# Patient Record
Sex: FEMALE | Race: BLACK OR AFRICAN AMERICAN | NOT HISPANIC OR LATINO | URBAN - METROPOLITAN AREA
[De-identification: names, ages, dates, MRNs, and addresses within clinical notes are randomized per-mention and may not be internally consistent; named-entity substitution may affect disease eponyms.]

---

## 2021-08-18 ENCOUNTER — NURSE TRIAGE (OUTPATIENT)
Dept: NURSING | Facility: CLINIC | Age: 36
End: 2021-08-18

## 2021-08-18 NOTE — TELEPHONE ENCOUNTER
Error, patient wanted to speak with specialty pharamcy.    Denise Antonio RN on 8/18/2021 at 5:23 PM

## 2021-09-28 ENCOUNTER — TRANSFERRED RECORDS (OUTPATIENT)
Dept: HEALTH INFORMATION MANAGEMENT | Facility: CLINIC | Age: 36
End: 2021-09-28

## 2021-09-29 ENCOUNTER — TRANSCRIBE ORDERS (OUTPATIENT)
Dept: OTHER | Age: 36
End: 2021-09-29

## 2021-09-29 DIAGNOSIS — H15.009 SCLERITIS: Primary | ICD-10-CM

## 2021-10-03 PROBLEM — Z86.69 HISTORY OF IRITIS: Status: ACTIVE | Noted: 2021-10-03

## 2021-10-04 ENCOUNTER — OFFICE VISIT (OUTPATIENT)
Dept: OPHTHALMOLOGY | Facility: CLINIC | Age: 36
End: 2021-10-04
Attending: OPHTHALMOLOGY
Payer: COMMERCIAL

## 2021-10-04 ENCOUNTER — LAB (OUTPATIENT)
Dept: LAB | Facility: CLINIC | Age: 36
End: 2021-10-04
Payer: COMMERCIAL

## 2021-10-04 DIAGNOSIS — Z86.69 HISTORY OF IRITIS: ICD-10-CM

## 2021-10-04 DIAGNOSIS — H40.051 OCULAR HYPERTENSION OF RIGHT EYE: ICD-10-CM

## 2021-10-04 DIAGNOSIS — H15.091 NODULAR SCLERITIS OF RIGHT EYE: Primary | ICD-10-CM

## 2021-10-04 DIAGNOSIS — Z79.899 HIGH RISK MEDICATION USE: ICD-10-CM

## 2021-10-04 DIAGNOSIS — H15.091 NODULAR SCLERITIS OF RIGHT EYE: ICD-10-CM

## 2021-10-04 PROBLEM — M19.90 INFLAMMATORY ARTHRITIS: Status: ACTIVE | Noted: 2018-10-10

## 2021-10-04 LAB
T PALLIDUM AB SER QL: NONREACTIVE
URATE SERPL-MCNC: 2.9 MG/DL (ref 2.6–6)

## 2021-10-04 PROCEDURE — G0463 HOSPITAL OUTPT CLINIC VISIT: HCPCS

## 2021-10-04 PROCEDURE — 92134 CPTRZ OPH DX IMG PST SGM RTA: CPT | Performed by: OPHTHALMOLOGY

## 2021-10-04 PROCEDURE — 86255 FLUORESCENT ANTIBODY SCREEN: CPT | Performed by: OPHTHALMOLOGY

## 2021-10-04 PROCEDURE — 36415 COLL VENOUS BLD VENIPUNCTURE: CPT | Performed by: PATHOLOGY

## 2021-10-04 PROCEDURE — 86780 TREPONEMA PALLIDUM: CPT | Performed by: OPHTHALMOLOGY

## 2021-10-04 PROCEDURE — 99204 OFFICE O/P NEW MOD 45 MIN: CPT | Mod: GC | Performed by: OPHTHALMOLOGY

## 2021-10-04 PROCEDURE — 82164 ANGIOTENSIN I ENZYME TEST: CPT | Mod: 90 | Performed by: PATHOLOGY

## 2021-10-04 PROCEDURE — 84550 ASSAY OF BLOOD/URIC ACID: CPT | Performed by: PATHOLOGY

## 2021-10-04 RX ORDER — FLURBIPROFEN 100 MG
100 TABLET ORAL
Status: ON HOLD | COMMUNITY
Start: 2021-09-17 | End: 2024-08-27

## 2021-10-04 RX ORDER — PREDNISOLONE ACETATE 10 MG/ML
SUSPENSION/ DROPS OPHTHALMIC 2 TIMES DAILY
COMMUNITY
Start: 2021-09-01 | End: 2021-10-04

## 2021-10-04 RX ORDER — CIPROFLOXACIN HYDROCHLORIDE 3.5 MG/ML
1 SOLUTION/ DROPS TOPICAL 2 TIMES DAILY
Qty: 5 ML | Refills: 1 | Status: ON HOLD | OUTPATIENT
Start: 2021-10-04 | End: 2024-08-27

## 2021-10-04 RX ORDER — IBUPROFEN 600 MG/1
600 TABLET, FILM COATED ORAL
COMMUNITY
Start: 2021-08-30 | End: 2021-10-04

## 2021-10-04 RX ORDER — METHYLPREDNISOLONE 4 MG
TABLET, DOSE PACK ORAL
Qty: 21 TABLET | Refills: 0 | Status: SHIPPED | OUTPATIENT
Start: 2021-10-04 | End: 2021-10-11

## 2021-10-04 RX ORDER — PREDNISOLONE ACETATE 10 MG/ML
1 SUSPENSION/ DROPS OPHTHALMIC 2 TIMES DAILY
Qty: 5 ML | Refills: 2 | Status: ON HOLD | OUTPATIENT
Start: 2021-10-04 | End: 2024-08-27

## 2021-10-04 RX ORDER — BRIMONIDINE TARTRATE AND TIMOLOL MALEATE 2; 5 MG/ML; MG/ML
1 SOLUTION OPHTHALMIC 2 TIMES DAILY
Qty: 5 ML | Refills: 3 | Status: ON HOLD | OUTPATIENT
Start: 2021-10-04 | End: 2024-08-27

## 2021-10-04 RX ORDER — FOLIC ACID 1 MG/1
1 TABLET ORAL
Status: ON HOLD | COMMUNITY
Start: 2021-09-23 | End: 2024-08-27

## 2021-10-04 ASSESSMENT — VISUAL ACUITY
METHOD: SNELLEN - LINEAR
OD_SC: 20/60
OD_PH_SC: 20/20
OS_SC: 20/30

## 2021-10-04 ASSESSMENT — TONOMETRY
IOP_METHOD: TONOPEN
OD_IOP_MMHG: 17
OS_IOP_MMHG: 18

## 2021-10-04 ASSESSMENT — SLIT LAMP EXAM - LIDS
COMMENTS: MILD MGD
COMMENTS: MILD MGD

## 2021-10-04 ASSESSMENT — CONF VISUAL FIELD
METHOD: COUNTING FINGERS
OS_NORMAL: 1
OD_NORMAL: 1

## 2021-10-04 ASSESSMENT — CUP TO DISC RATIO
OS_RATIO: 0.25
OD_RATIO: 0.25

## 2021-10-04 ASSESSMENT — EXTERNAL EXAM - RIGHT EYE: OD_EXAM: NORMAL

## 2021-10-04 ASSESSMENT — EXTERNAL EXAM - LEFT EYE: OS_EXAM: NORMAL

## 2021-10-04 ASSESSMENT — PATIENT HEALTH QUESTIONNAIRE - PHQ9: SUM OF ALL RESPONSES TO PHQ QUESTIONS 1-9: 15

## 2021-10-04 NOTE — PATIENT INSTRUCTIONS
Please get these lab tests done today: Treponema, ANCA, ACE, Uric Acid    Start Medrol dose pack for one week then stop.     Continue prednisolone 2x/day in the right eye    Continue Combigan 2x/day in the right eye    Add an antibiotic drop called Ciprofloxacin 2x/day in the right eye to prevent infection    Continue Enbrel weekly and Methotrexate 20 mg weekly

## 2021-10-04 NOTE — PROGRESS NOTES
Chief Complaint/Presenting Concern: Uveitis evaluation.    History of Present Illness:   Roseann Garcia is a 35 year old patient who presents for evaluation of scleritis of the right eye.    The symptoms of redness, deep pain with headaches, some blurred vision started suddenly 4.5 weeks ago. Ms. Garcia was off Enbrel for a few weeks (due to difficulty getting the medication) and her eye symptoms started shortly after restarting the medication. When her symptoms started, she went to urgent care and was given topical antibiotic eyedrops. Due to lack of improvement she went to Dr Álvaro Palm O.D. ([facility]) and was diagnosed with iritis and started on topical prednisolone drops to be used 4 times per day. At follow up, patient continued to have redness of the eye and was started another which she feels worsened the eye redness and pain. She subsequently was diagnosed with scleritis and saw Dr Merritt Connors MD and Janae Barnes MD (both at [facility]) and was referred to [facility] for second opinion.     Ms. Garcia recently started oral methotrexate a few weeks ago and was also given an oral Medrol Dosepak which she completed 3 days ago.  She felt the Medrol Dosepak helped with the redness but that the symptoms have recurred since stopping this a few days ago    Additional Ocular History:   1. Has a history of iritis which resolved with prednisone drops 10 years ago.      Relevant Past Medical/Family/Social History:  1. Inflammatory arthritis with negative anti-nuclear antibody and RF (2013), also negative HLA B27.  Arthritis has primarily involved her left knee (requiring steroid injections in past) but also elbow and wrist pain and neck stiffness and more recently, plantar fasciatis. She previously tried methotrexate in 2016 but at that time did not follow up for a year and insisted it did not work. Since April 2018, patient has been on Etanercept (Enbrel) 50mg weekly and is currently still on the  medication after that few week gap.. She has found great relief on Etanercept for her joint pain symptoms she is doing well back on methotrexate currently for the last few weeks.  Previously experienced significant weight gain on oral prednisone    2. No known personal (checked) and no family history of sickle cell    Family history diabetes and HTN but denies any autoimmune disease other than   Grandmother history of arthritis.     Self employed, owns a clothing store. alcohol 3 shots everyday, denies smoking cigarette, but does use marijuana    Relevant Review of Systems: Mild wrist pain but used to have knee, wrist, elbow and neck pain prior to using Etanercept. Denies skin findings, mouth ulcers, GI problems, shortness of breath, chest pain. Mild fatigue but no significant GI Issues on Methotrexate.     Laboratory Testing October 4, 2021  Abnormal: TSH (low at 0.08 [0.35-4.94]  Normal (6/24/2021) CBC, creatinine, ALT, AST.  Negative Quant gold (3/2018)     Current eye related medications: Prednisone drops 2 times per day right eye, Combigan 2 times per day right eye, Enbrel 50 mg weekly, Oral Methotrexate 20 mg weekly for the past 2 weeks, Folic acid daily.     Retina/Uveitis Imaging:  OCT Spectralis Macula October 4, 2021  right eye: Normal contour, no choroidal thickening, no CME  left eye:  Normal contour, no choroidal thickening, no CME    Assessment:    1. Nodular anterior scleritis of right eye  Remains active on current therapy    2. History of iritis  Inactive today    3. Ocular hypertension of right eye  Possibly secondary to topical steroid use    4. High risk medication use  Enbrel 50 mg weekly, Oral Methotrexate 20 mg weekly for the past 2 weeks, Folic acid daily.     Plan/Recommendations:    Discussed findings with patient.  Agree that there is nodular anterior scleritis of the right eye which started suddenly a few weeks ago with reinitiation of Enbrel. One of my colleagues found a journal article  which reports 3 cases of scleritis which has been seen in association with Enbrel and referenced here: Scleritis: A Paradoxical Effect of Etanercept? Etanercept-associated Inflammatory Eye Disease. ASHUTOSH GUTIERREZ, NHI WHITT, DEISY RALPH, LIVE SHARP, JOSE MYRICK, THERESE RHODES, REI MONTES, SARAH BENDER. The Journal of Rheumatology Feb 2012, 39 (2) 233-239.  It is also certainly possible that this is a manifestation of the tendency towards inflammation of the body now presenting as scleritis rather than iritis.    Dilated examination shows a slight nodularity of the sclera along the anterior aspect inferiorly in the right eye but no overlying fluid nor choroidal detachment. OCT scan was without thickening of the choroid nor retinal edema.  Hence this is likely isolated anterior nodular scleritis of the right eye.    The left eye has no evidence of iritis and no scleritis currently    We discussed additional lab testing for other causes of scleritis and Ms. Garcia was able to go to the laboratory today: Treponema, ANCA, ACE, Uric acid    Eye pressure is controlled on Combigan, so we will continue this twice daily    We discussed short-term options for therapy including a longer more protracted course of oral prednisone with taper.  However due to significant weight gain previously, we elected on a another course of a Medrol Dosepak for 1 week.  I discussed with the patient that the scleritis could potentially flare when the Medrol Dosepak is stopped, but perhaps with additional time on methotrexate this can help to control the inflammation in the short-term      Continue prednisolone 2x/day in the right eye    Continue Combigan 2x/day in the right eye    Add an antibiotic drop called Ciprofloxacin 2x/day in the right eye to prevent infection given the focal areas of corneal epithelial irregularity in the right eye without infection    Start Medrol dose pack for one week    Continue Enbrel  weekly and Methotrexate 20 mg weekly. I discussed this with the patient and given that her inflammatory arthritis is well controlled on Enbrel, that it would be reasonable to consider additional time on methotrexate to help assess efficacy before making any other recommendations such as switching biologic therapy from Enbrel    RTC 3 weeks tonopen, dilate OD, no testing.     Joe Sam MD  Vitreoretinal Surgery Fellow  [facility]     .Attending Physician Attestation:  Complete documentation of historical and exam elements from today's encounter can be found in the full encounter summary report (not reduplicated in this progress note). I reviewed the chief complaint(s) and history of present illness, and  confirmed and edited as necessary the review of systems, past medical/surgical history, family history, social history, and examination findings as documented by others and the treating Resident or Fellow Physician.    I examined the patient myself, discussed the findings, reviewed all ancillary testing data and modified these results and reports along with the assessment and plan with the Treating Resident or Fellow Physician. I agree with the note as detailed above.   Fady Fox M.D.  Uveitis and Medical Retina  October 4, 2021

## 2021-10-04 NOTE — LETTER
10/4/2021       RE: Roseann Garcia  [address]  [place] [address]     Dear Colleague,    Thank you for referring your patient, Roseann Garcia, to the [facility] at [facility]. Please see a copy of my visit note below.    Chief Complaint/Presenting Concern: Uveitis evaluation.    History of Present Illness:   Roseann Garcia is a 35 year old patient who presents for evaluation of scleritis of the right eye.    The symptoms of redness, deep pain with headaches, some blurred vision started suddenly 4.5 weeks ago. Ms. Garcia was off Enbrel for a few weeks (due to difficulty getting the medication) and her eye symptoms started shortly after restarting the medication. When her symptoms started, she went to urgent care and was given topical antibiotic eyedrops. Due to lack of improvement she went to Dr Álvrao Palm O.D. ([facility]) and was diagnosed with iritis and started on topical prednisolone drops to be used 4 times per day. At follow up, patient continued to have redness of the eye and was started another which she feels worsened the eye redness and pain. She subsequently was diagnosed with scleritis and saw Dr Merritt Connors MD and Janae Barnes MD (both at [facility]) and was referred to [facility] for second opinion.     Ms. Garcia recently started oral methotrexate a few weeks ago and was also given an oral Medrol Dosepak which she completed 3 days ago.  She felt the Medrol Dosepak helped with the redness but that the symptoms have recurred since stopping this a few days ago    Additional Ocular History:   1. Has a history of iritis which resolved with prednisone drops 10 years ago.      Relevant Past Medical/Family/Social History:  1. Inflammatory arthritis with negative anti-nuclear antibody and RF (2013), also negative HLA B27.  Arthritis has primarily involved her left knee (requiring steroid injections in past) but also  elbow and wrist pain and neck stiffness and more recently, plantar fasciatis. She previously tried methotrexate in 2016 but at that time did not follow up for a year and insisted it did not work. Since April 2018, patient has been on Etanercept (Enbrel) 50mg weekly and is currently still on the medication after that few week gap.. She has found great relief on Etanercept for her joint pain symptoms she is doing well back on methotrexate currently for the last few weeks.  Previously experienced significant weight gain on oral prednisone    2. No known personal (checked) and no family history of sickle cell    Family history diabetes and HTN but denies any autoimmune disease other than   Grandmother history of arthritis.     Self employed, owns a clothing store. alcohol 3 shots everyday, denies smoking cigarette, but does use marijuana    Relevant Review of Systems: Mild wrist pain but used to have knee, wrist, elbow and neck pain prior to using Etanercept. Denies skin findings, mouth ulcers, GI problems, shortness of breath, chest pain. Mild fatigue but no significant GI Issues on Methotrexate.     Laboratory Testing October 4, 2021  Abnormal: TSH (low at 0.08 [0.35-4.94]  Normal (6/24/2021) CBC, creatinine, ALT, AST.  Negative Quant gold (3/2018)     Current eye related medications: Prednisone drops 2 times per day right eye, Combigan 2 times per day right eye, Enbrel 50 mg weekly, Oral Methotrexate 20 mg weekly for the past 2 weeks, Folic acid daily.     Retina/Uveitis Imaging:  OCT Spectralis Macula October 4, 2021  right eye: Normal contour, no choroidal thickening, no CME  left eye:  Normal contour, no choroidal thickening, no CME    Assessment:    1. Nodular anterior scleritis of right eye  Remains active on current therapy    2. History of iritis  Inactive today    3. Ocular hypertension of right eye  Possibly secondary to topical steroid use    4. High risk medication use  Enbrel 50 mg weekly, Oral  Methotrexate 20 mg weekly for the past 2 weeks, Folic acid daily.     Plan/Recommendations:    Discussed findings with patient.  Agree that there is nodular anterior scleritis of the right eye which started suddenly a few weeks ago with reinitiation of Enbrel. One of my colleagues found a journal article which reports 3 cases of scleritis which has been seen in association with Enbrel and referenced here: Scleritis: A Paradoxical Effect of Etanercept? Etanercept-associated Inflammatory Eye Disease. ASHUTOSH GUTIERREZ, NHI WHITT, DEISY RALPH, LIVE SHARP, JOSE MYRICK, THERESE RHODES, REI MONTES, SARAH BENDER. The Journal of Rheumatology Feb 2012, 39 (2) 233-239.  It is also certainly possible that this is a manifestation of the tendency towards inflammation of the body now presenting as scleritis rather than iritis.    Dilated examination shows a slight nodularity of the sclera along the anterior aspect inferiorly in the right eye but no overlying fluid nor choroidal detachment. OCT scan was without thickening of the choroid nor retinal edema.  Hence this is likely isolated anterior nodular scleritis of the right eye.    The left eye has no evidence of iritis and no scleritis currently    We discussed additional lab testing for other causes of scleritis and Ms. Garcia was able to go to the laboratory today: Treponema, ANCA, ACE, Uric acid    Eye pressure is controlled on Combigan, so we will continue this twice daily    We discussed short-term options for therapy including a longer more protracted course of oral prednisone with taper.  However due to significant weight gain previously, we elected on a another course of a Medrol Dosepak for 1 week.  I discussed with the patient that the scleritis could potentially flare when the Medrol Dosepak is stopped, but perhaps with additional time on methotrexate this can help to control the inflammation in the short-term      Continue prednisolone  2x/day in the right eye    Continue Combigan 2x/day in the right eye    Add an antibiotic drop called Ciprofloxacin 2x/day in the right eye to prevent infection given the focal areas of corneal epithelial irregularity in the right eye without infection    Start Medrol dose pack for one week    Continue Enbrel weekly and Methotrexate 20 mg weekly. I discussed this with the patient and given that her inflammatory arthritis is well controlled on Enbrel, that it would be reasonable to consider additional time on methotrexate to help assess efficacy before making any other recommendations such as switching biologic therapy from Enbrel    RTC 3 weeks tonopen, dilate OD, no testing.     Joe Sam MD  Vitreoretinal Surgery Fellow  [facility]     .Attending Physician Attestation:  Complete documentation of historical and exam elements from today's encounter can be found in the full encounter summary report (not reduplicated in this progress note). I reviewed the chief complaint(s) and history of present illness, and  confirmed and edited as necessary the review of systems, past medical/surgical history, family history, social history, and examination findings as documented by others and the treating Resident or Fellow Physician.    I examined the patient myself, discussed the findings, reviewed all ancillary testing data and modified these results and reports along with the assessment and plan with the Treating Resident or Fellow Physician. I agree with the note as detailed above.   Fady Fox M.D.  Uveitis and Medical Retina  October 4, 2021    Again, thank you for allowing me to participate in the care of your patient.    Sincerely,    Fady Fox MD  [facility] Dept of Ophthalmology  Uveitis and Medical Retina

## 2021-10-04 NOTE — NURSING NOTE
Chief Complaints and History of Present Illnesses   Patient presents with     Scleritis Evaluation     Chief Complaint(s) and History of Present Illness(es)     Scleritis Evaluation     Laterality: right eye    Onset: gradual    Onset: months ago    Quality: Blurry va      Severity: moderate    Frequency: intermittently    Associated symptoms: dryness, redness, tearing and photophobia.  Negative for flashes and floaters (+irritation   )    Treatments tried: eye drops    Pain scale: 0/10              Comments     States she had uvietis about 2 yrs ago but this is the first flare of scleritis that has lasted for about 4 weeks in the right eye  PF BID right eye   Blue top at bedtime right eye  Loyda Mccollum COT 8:28 AM October 4, 2021

## 2021-10-05 LAB
ACE SERPL-CCNC: 25 U/L
ANCA AB PATTERN SER IF-IMP: NORMAL
C-ANCA TITR SER IF: NORMAL {TITER}

## 2021-10-08 ENCOUNTER — TELEPHONE (OUTPATIENT)
Dept: OPHTHALMOLOGY | Facility: CLINIC | Age: 36
End: 2021-10-08

## 2021-10-08 NOTE — TELEPHONE ENCOUNTER
Spoke to pt at 1500     Pt states needing refill for combigan eye drop-- reviewed Rx sent 10-4-2021 and asked pt to reach out to pharmacy to ensure able to dispense and would call pt back    Spoke to pt at 1600-- pt states pharmacy was able to order in today and pt able to  today.    Pt states eye has felt more irritated without-- no new eye pain/visin change    Recommended trying OTC lubricating eye drops between medicated eye drops.    Pt aware to contact clinic for any worsening symptoms/vision changes    Valeriy Leonardo RN 4:04 PM 10/08/21             [facility] Call Center    Phone Message    May a detailed message be left on voicemail: yes     Reason for Call: Other: Roseann calling to request a call back. She would like to speak to her care team in regards to her eye drops. Please call her back to discuss.      Action Taken: Message routed to:  [facility]  eye    Travel Screening: Not Applicable

## 2021-10-20 ENCOUNTER — TELEPHONE (OUTPATIENT)
Dept: OPHTHALMOLOGY | Facility: CLINIC | Age: 36
End: 2021-10-20

## 2021-10-20 DIAGNOSIS — H15.091 NODULAR SCLERITIS OF RIGHT EYE: ICD-10-CM

## 2021-10-20 RX ORDER — METHYLPREDNISOLONE 4 MG
TABLET, DOSE PACK ORAL
Qty: 21 TABLET | Refills: 0 | Status: SHIPPED | OUTPATIENT
Start: 2021-10-20 | End: 2021-10-20

## 2021-10-20 RX ORDER — METHYLPREDNISOLONE 4 MG
TABLET, DOSE PACK ORAL
Qty: 21 TABLET | Refills: 0 | Status: SHIPPED | OUTPATIENT
Start: 2021-10-20 | End: 2021-12-03

## 2021-10-20 NOTE — TELEPHONE ENCOUNTER
Patient called to request a refill of the Medrol Dosepak.  I discussed with her at the last visit that it is likely the scleritis would flare after she stopped the Medrol Dosepak.  As she does not have an appointment for a week I will refill this 1 time but I will ask our staff to inform her that this is not a long-term solution for her condition and she should make sure she communicates with her rheumatologist regarding the things we discussed last visit    Fady Fox M.D.  Uveitis and Medical Retina  [facility] Department of Ophthalmology and Visual Neurosciences

## 2021-10-20 NOTE — TELEPHONE ENCOUNTER
[facility] Call Center    Phone Message    May a detailed message be left on voicemail: yes     Reason for Call: Medication Question or concern regarding medication   Prescription Clarification  Name of Medication: methylprednisolone (MEDROL DOSEPAK) 4 MG tablet therapy pack  Prescribing Provider: CONY   Pharmacy: [id number] - [place], [place] - [address] AT [facility] [address]   What on the order needs clarification? Pt requesting for a refill of this rx.       Action Taken: Other: eye    Travel Screening: Not Applicable

## 2021-10-20 NOTE — TELEPHONE ENCOUNTER
I spoke to the patient who requested a refill for the medication listed.  I consulted with her provider who would like her to reach out to her rheumatologist for further care plan.  The patient expressed understanding.

## 2021-12-01 ENCOUNTER — TELEPHONE (OUTPATIENT)
Dept: OPHTHALMOLOGY | Facility: CLINIC | Age: 36
End: 2021-12-01
Payer: COMMERCIAL

## 2021-12-01 NOTE — TELEPHONE ENCOUNTER
Thank you for the note and let me know when we receive the photo. She called about a possible flare since her last visit and I did okay another Medrol dose pack but would NOT do so again unless we see her. We can see Friday or next week if needed based on photo. Thank you.

## 2021-12-01 NOTE — TELEPHONE ENCOUNTER
Spoke to pt at 1400    New pain in corner of right eye this AM along with new redness on eye    No photophobia.    No vision changes    Eye irritable and looks like blood spot on white part of eye in corner.    Pt states same symptom as before before flare    Asked pt to send image to [email or web address]   (pt not on MyChart)    Will review message/image with Dr. Fox once received.    Valeriy Leonardo RN 2:17 PM 12/01/21            [facility] Call Center    Phone Message    May a detailed message be left on voicemail: yes     Reason for Call: Symptoms or Concerns     If patient has red-flag symptoms, warm transfer to triage line    Current symptom or concern: Pt states she's having a flare-up of pain in her eye as of this morning.    Symptoms have been present for:  1/2 day(s)    Has patient previously been seen for this? Yes    By : Dr. Fox    Date: 10/4/21    Are there any new or worsening symptoms? No      Action Taken: Message routed to:  [facility] EYE    Travel Screening: Not Applicable

## 2021-12-01 NOTE — TELEPHONE ENCOUNTER
Thank you for forwarding the images by email. It does appear like recurrence of the scleritis. Glad we are able to see her on Friday, 12/3.

## 2021-12-01 NOTE — TELEPHONE ENCOUNTER
Reviewed with Dr. haney      Recommends in clinic evaluation.  Pt scheduled this Friday at 11 AM--9th floor PWB location    Pt aware of date/time/location     Valeriy Leonardo RN 5:31 PM 12/01/21

## 2021-12-03 ENCOUNTER — OFFICE VISIT (OUTPATIENT)
Dept: OPHTHALMOLOGY | Facility: CLINIC | Age: 36
End: 2021-12-03
Attending: OPHTHALMOLOGY
Payer: COMMERCIAL

## 2021-12-03 DIAGNOSIS — H15.091 NODULAR SCLERITIS OF RIGHT EYE: Primary | ICD-10-CM

## 2021-12-03 DIAGNOSIS — Z86.69 HISTORY OF IRITIS: ICD-10-CM

## 2021-12-03 DIAGNOSIS — H40.051 OCULAR HYPERTENSION OF RIGHT EYE: ICD-10-CM

## 2021-12-03 DIAGNOSIS — Z79.899 HIGH RISK MEDICATION USE: ICD-10-CM

## 2021-12-03 PROCEDURE — G0463 HOSPITAL OUTPT CLINIC VISIT: HCPCS

## 2021-12-03 PROCEDURE — 99214 OFFICE O/P EST MOD 30 MIN: CPT | Performed by: OPHTHALMOLOGY

## 2021-12-03 RX ORDER — METHYLPREDNISOLONE 4 MG
TABLET, DOSE PACK ORAL
Qty: 21 TABLET | Refills: 1 | Status: SHIPPED | OUTPATIENT
Start: 2021-12-03 | End: 2022-06-23

## 2021-12-03 ASSESSMENT — VISUAL ACUITY
OS_SC: 20/50
OD_SC+: -2
OS_PH_SC: 20/25
OD_SC: 20/20
METHOD: SNELLEN - LINEAR

## 2021-12-03 ASSESSMENT — CONF VISUAL FIELD
OS_NORMAL: 1
METHOD: COUNTING FINGERS
OD_NORMAL: 1

## 2021-12-03 ASSESSMENT — CUP TO DISC RATIO
OD_RATIO: 0.25
OS_RATIO: 0.25

## 2021-12-03 ASSESSMENT — EXTERNAL EXAM - RIGHT EYE: OD_EXAM: NORMAL

## 2021-12-03 ASSESSMENT — SLIT LAMP EXAM - LIDS
COMMENTS: MILD MGD
COMMENTS: MILD MGD

## 2021-12-03 ASSESSMENT — EXTERNAL EXAM - LEFT EYE: OS_EXAM: NORMAL

## 2021-12-03 ASSESSMENT — TONOMETRY
OD_IOP_MMHG: 9
IOP_METHOD: APPLANATION
OS_IOP_MMHG: 12

## 2021-12-03 NOTE — PATIENT INSTRUCTIONS
Continue Enbrel every week    Continue Oral Methotrexate 20 mg (8 pills) every week and folic acid daily. We will ask Dr. Bedolla about switching to injectable methotrexate up to 25 mg each week. Given that joints doing so well on Enbrel, may be best to continue this biologic    Continue the Medrol dose pack and finish that up. We sent a refill if needed. If this starts flaring every month, we might consider other options with medications    For the Combigan and Ciprofloxacin, okay to continue 2x/day in the right eye for 2 more days then stop    For the steroid drop (Prednisolone), continue 2x/day until next month

## 2021-12-03 NOTE — PROGRESS NOTES
Chief Complaint/Presenting Concern:  Uveitis follow up    Interval History of Present Ocular Illness:  Roseann Garcia is a 35 year old patient who returns for follow up of her scleritis of the right eye.  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  We recommended a Medrol Dosepak, additional lab testing, consideration of potential change in systemic therapy with rheumatology.    Ms. Garcia reported things were doing fairly well although she had some symptoms again in November and then again most recently.  Each time things have responded to the Medrol Dosepak.  Recently she had some important life events and was off methotrexate for 3 weeks.  She asked if this could have been related to the scleritis in the right eye, which is now better on Medrol Dosepak for the last few days along with eyedrops as noted below    Took medrol dose pack and this helped. One more episode few days ago,    Interval Updates to Medical/Family/Social History:    Joints doing well on the current medicines.Had 3 week gap off methotrexate due to other life events including earning her Bachelor's degree in criminal justice!    Relevant Review of Systems Updates:  No joint pains, no coughs/colds.    Laboratory Testin. Normal AST and ALT in 2021  2. Normal ACE. Treponema, ANCA negative. Uric acid normal in 2021    Current eye related medications: Restarted Cipro 2x/day right eye, Combigan 2x/day right eye and Prednisolone 2x/day right eye, Medrol pack (day 3 of 7), Methotrexate 20 mg weekly, Enbrel weekly    Retina/Uveitis Imaging: None toaday    Assessment:     1. Nodular scleritis of right eye  Improved compared to our last visit in October although with some symptoms 2 days ago responding to Medrol Dosepak.  The limbal infiltrates have resolved but there has been interval subtle scleral thinning without uveal exposure    2. History of iritis  No iritis in  either eye today    3. Ocular hypertension of right eye  Normal on Combigan    4. High risk medication use  Oral methotrexate 20 mg weekly, Enbrel weekly, Medrol Dosepak day 3 of 7    Plan/Recommendations:      Discussed findings with patient.  It is possible that this episode of scleritis may have been related to being off the methotrexate for 3 weeks.  Fortunately as previously and currently this is responding to the Medrol Dosepak.  As below, we will ask Dr. Bedolla about potentially switching to subcutaneous methotrexate    We discussed ongoing use of the Enbrel.  Given that the scleritis does not appear to be related to the Enbrel, we discussed possibly switching Biologics to determine if there should be more likely to help resolve the scleritis. Ms. Garcia was very clear that the Enbrel has done dramatically well for her joints and she would be hesitant to switch Biologics with potential uncertainty of benefit for her joints.  Reasonable to start with dose change to methotrexate as below if okay with Dr. Bedolla    Eye pressure is normal in both eyes    No additional lab testing recommended at this time as lab testing we recommended in October 2021 was unrevealing for other causes of scleritis    Continue Enbrel every week    Continue Oral Methotrexate 20 mg (8 pills) every week and folic acid daily. We will ask Dr. Bedolla about switching to injectable methotrexate up to 25 mg each week. Given that joints doing so well on Enbrel, may be best to continue this biologic    Continue the Medrol dose pack and finish that up. We sent a refill if needed. If this starts flaring every month, we might consider other options with medications    For the Combigan and Ciprofloxacin, okay to continue 2x/day in the right eye for 2 more days then stop    For the steroid drop (Prednisolone), continue 2x/day until next month as maintenance    RTC Cancel 12/15 and reschedule to mid January Applanate, dilate right eye, no  testing    Physician Attestation     Attending Physician Attestation:  Complete documentation of historical and exam elements from today's encounter can be found in the full encounter summary report (not reduplicated in this progress note). I personally obtained the chief complaint(s) and history of present illness. I confirmed and edited as necessary the review of systems, past medical/surgical history, family history, social history, and examination findings as documented by others; and I examined the patient myself. I personally reviewed the relevant tests, images, and reports as documented above. I formulated and edited as necessary the assessment and plan and discussed the findings and management plan with the patient and family members present at the time of this visit.  Fady Fox M.D., Uveitis and Medical Retina, December 3, 2021

## 2021-12-03 NOTE — NURSING NOTE
Chief Complaints and History of Present Illnesses   Patient presents with     Scleritis Follow Up     Chief Complaint(s) and History of Present Illness(es)     Scleritis Follow Up     Laterality: right eye    Onset: sudden    Onset: months ago    Course: gradually improving    Associated symptoms: Negative for eye pain, dryness, tearing, flashes, floaters and photophobia    Treatments tried: eye drops    Pain scale: 0/10              Comments     Pt here for Nodular scleritis of right eye.  Vision has improved since last visit.    Prednisolone daily in the right eye  Combigan daily in the right eye  Ciprofloxacin daily in the right eye  Enbrel weekly  Methotrexate 20 mg weekly    ALBIN Cruz December 3, 2021 11:18 AM

## 2021-12-03 NOTE — LETTER
12/3/2021       RE: Roseann Garcia  [address]  [place] [address]     Dear Colleague,    Thank you for referring your patient, Roseann Garcia, to the [facility] at [facility]. Please see a copy of my visit note below.    Chief Complaint/Presenting Concern:  Uveitis follow up    Interval History of Present Ocular Illness: Roseann Garcia is a 35 year old patient who returns for follow up of her scleritis of the right eye.  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  We recommended a Medrol Dosepak, additional lab testing, consideration of potential change in systemic therapy with rheumatology.    Ms. Garcia reported things were doing fairly well although she had some symptoms again in November and then again most recently.  Each time things have responded to the Medrol Dosepak.  Recently she had some important life events and was off methotrexate for 3 weeks.  She asked if this could have been related to the scleritis in the right eye, which is now better on Medrol Dosepak for the last few days along with eyedrops as noted below    Took medrol dose pack and this helped. One more episode few days ago,    Interval Updates to Medical/Family/Social History:    Joints doing well on the current medicines.Had 3 week gap off methotrexate due to other life events including earning her Bachelor's degree in criminal justice!    Relevant Review of Systems Updates:  No joint pains, no coughs/colds.    Laboratory Testin. Normal AST and ALT in 2021  2. Normal ACE. Treponema, ANCA negative. Uric acid normal in 2021    Current eye related medications: Restarted Cipro 2x/day right eye, Combigan 2x/day right eye and Prednisolone 2x/day right eye, Medrol pack (day 3 of 7), Methotrexate 20 mg weekly, Enbrel weekly    Retina/Uveitis Imaging: None toaday    Assessment:   1.  Nodular scleritis of right eye Improved compared to our last visit in October although with some symptoms 2 days ago responding to Medrol Dosepak.  The limbal infiltrates have resolved but there has been interval subtle scleral thinning without uveal exposure    2. History of iritis No iritis in either eye today    3. Ocular hypertension of right eye Normal on Combigan    4. High risk medication use Oral methotrexate 20 mg weekly, Enbrel weekly, Medrol Dosepak day 3 of 7    Plan/Recommendations:      Discussed findings with patient.  It is possible that this episode of scleritis may have been related to being off the methotrexate for 3 weeks.  Fortunately as previously and currently this is responding to the Medrol Dosepak.  As below, we will ask Dr. Bedolla about potentially switching to subcutaneous methotrexate    We discussed ongoing use of the Enbrel.  Given that the scleritis does not appear to be related to the Enbrel, we discussed possibly switching Biologics to determine if there should be more likely to help resolve the scleritis. Ms. Garcia was very clear that the Enbrel has done dramatically well for her joints and she would be hesitant to switch Biologics with potential uncertainty of benefit for her joints.  Reasonable to start with dose change to methotrexate as below if okay with Dr. Bedolla    Eye pressure is normal in both eyes    No additional lab testing recommended at this time as lab testing we recommended in October 2021 was unrevealing for other causes of scleritis    Continue Enbrel every week    Continue Oral Methotrexate 20 mg (8 pills) every week and folic acid daily. We will ask Dr. Bedolla about switching to injectable methotrexate up to 25 mg each week. Given that joints doing so well on Enbrel, may be best to continue this biologic    Continue the Medrol dose pack and finish that up. We sent a refill if needed. If this starts flaring every month, we might consider other options with  medications    For the Combigan and Ciprofloxacin, okay to continue 2x/day in the right eye for 2 more days then stop    For the steroid drop (Prednisolone), continue 2x/day until next month as maintenance    RTC Cancel 12/15 and reschedule to mid January Applanate, dilate right eye, no testing    Again, thank you for allowing me to participate in the care of your patient.      Sincerely,      Fady Fox MD  [facility] Dept of Ophthalmology  Uveitis and Medical Retina

## 2022-02-17 PROBLEM — Z79.899 HIGH RISK MEDICATION USE: Status: ACTIVE | Noted: 2021-10-03

## 2022-02-17 PROBLEM — H15.091: Status: ACTIVE | Noted: 2021-10-04

## 2022-06-27 ENCOUNTER — OFFICE VISIT (OUTPATIENT)
Dept: OPHTHALMOLOGY | Facility: CLINIC | Age: 37
End: 2022-06-27
Attending: OPHTHALMOLOGY
Payer: COMMERCIAL

## 2022-06-27 DIAGNOSIS — Z79.899 HIGH RISK MEDICATION USE: ICD-10-CM

## 2022-06-27 DIAGNOSIS — Z86.69 HISTORY OF IRITIS: ICD-10-CM

## 2022-06-27 DIAGNOSIS — H40.051 OCULAR HYPERTENSION OF RIGHT EYE: ICD-10-CM

## 2022-06-27 DIAGNOSIS — H15.091 NODULAR SCLERITIS OF RIGHT EYE: Primary | ICD-10-CM

## 2022-06-27 PROCEDURE — 99214 OFFICE O/P EST MOD 30 MIN: CPT | Performed by: OPHTHALMOLOGY

## 2022-06-27 PROCEDURE — G0463 HOSPITAL OUTPT CLINIC VISIT: HCPCS

## 2022-06-27 RX ORDER — METHYLPREDNISOLONE 4 MG
TABLET, DOSE PACK ORAL
Qty: 21 TABLET | Refills: 2 | Status: SHIPPED | OUTPATIENT
Start: 2022-06-27 | End: 2022-09-26

## 2022-06-27 ASSESSMENT — VISUAL ACUITY
OD_SC+: -2
OD_SC: 20/25
OS_PH_SC+: -2 SLOW
OS_SC: 20/40
OS_PH_SC: 20/25
METHOD: SNELLEN - LINEAR
OS_SC+: +1

## 2022-06-27 ASSESSMENT — CUP TO DISC RATIO
OS_RATIO: 0.25
OD_RATIO: 0.25

## 2022-06-27 ASSESSMENT — EXTERNAL EXAM - RIGHT EYE: OD_EXAM: NORMAL

## 2022-06-27 ASSESSMENT — TONOMETRY
OS_IOP_MMHG: 15
IOP_METHOD: APPLANATION
OD_IOP_MMHG: 13

## 2022-06-27 ASSESSMENT — SLIT LAMP EXAM - LIDS
COMMENTS: MILD MGD
COMMENTS: MILD MGD

## 2022-06-27 ASSESSMENT — EXTERNAL EXAM - LEFT EYE: OS_EXAM: NORMAL

## 2022-06-27 ASSESSMENT — CONF VISUAL FIELD
OD_NORMAL: 1
OS_NORMAL: 1

## 2022-06-27 NOTE — NURSING NOTE
"Chief Complaints and History of Present Illnesses   Patient presents with     Uveitis Follow-Up     Pt here for follow up on Nodular scleritis of right eye.      Chief Complaint(s) and History of Present Illness(es)     Uveitis Follow-Up     Laterality: right eye    Associated symptoms: Negative for eye pain, headache, photophobia and floaters    Comments: Pt here for follow up on Nodular scleritis of right eye.               Comments     Pt notes flare up \"the other day\". Eyes were doing well up until that point.   Pt using:   Prednisolone 2x/day right eye, Methotrexate 20 mg weekly, Enbrel weekly, just started Medrol dose pack.   SUNIL RHODES 7:46 AM June 27, 2022                   "

## 2022-06-27 NOTE — LETTER
6/27/2022       RE: Roseann Garcia  [address]  [place] [address]     Dear Colleague,    Thank you for referring your patient, Roseann Garcia, to the [facility] [place] at [facility]. Please see a copy of my visit note below.    Chief Complaint/Presenting Concern:  Uveitis follow up.    Interval History of Present Ocular Illness:  Roseann Garcia is a 36 year old patient who returns for follow up of her nodular scleritis of the right eye. At our last visit visit in December 2021, we recommended a Medrol dose pack for the flare of scleritis right eye. Roseann reports things had been doing well until a week ago or so, when the right eye started flaring up. As below, she had planned to be off methotrexate and Enbrel for tummy tuck. We sent a Medrol dose pack.    Interval Updates to Medical/Family/Social History:    Had Tummy Tuck a few weeks ago, and held Enbrel few weeks ago.    Relevant Review of Systems Updates:  Skin healing well, no joint issues.     No recent labs.     Current eye related medications: No steroid drops, No combigan and no ciprofloxacin, Medrol dose pack (day 3 of 6 completed), off Enbrel and Methotrexate for the last 3 weeks (until recovering from surgery).    Retina/Uveitis Imaging: None today    Assessment:     1. Nodular scleritis of right eye  Recurred last week. Flat today. Some limbal inflammation, no thinning.    2. History of iritis  None in either eye today.    3. Ocular hypertension of right eye  Normal off Combigan.    4. High risk medication use  Off Enbrel and methotrexate last three weeks (as recovering from Tummy tuck).    Plan/Recommendations:      Discussed findings with patient. The nodule recurred last week, improved on medrol dose pack. There is no thinning and no active nodule today, some limbal inflammation but no epi defect. This is likely due to being off methotrexate and Enbrel for a few weeks  as planned for tummy tuck and should improve once these are restarted.    There are no internal nodules in the right eye on dilated exam.     The left eye is doing well without inflammation.     Eye pressure is normal off Combigan. We can monitor off this drop as optic nerve without cupping.    Recommend additional testing: None at this time.    Given that Enbrel and Methotrexate had been used without scleritis flares until last week, would not recommend any specific changes recommended. Recommend restarting Methotrexate and Enbrel once your surgeon feels safe.    No steroid drop, antibiotic drop, or combigan needed.    Finish up the Medrol pack as directed. We sent refills to be used if needed. Things should improve once the enbrel and methotrexate are restarted. If not better by 2nd week of July, please let us know.    Recommend follow up with Dr. Graeme coy.     RTC 6 months tonopen, dilate right eye, and RNFL (ordered)    Physician Attestation     Attending Physician Attestation:  Complete documentation of historical and exam elements from today's encounter can be found in the full encounter summary report (not reduplicated in this progress note). I personally obtained the chief complaint(s) and history of present illness. I confirmed and edited as necessary the review of systems, past medical/surgical history, family history, social history, and examination findings as documented by others; and I examined the patient myself. I personally reviewed the relevant tests, images, and reports as documented above. I formulated and edited as necessary the assessment and plan and discussed the findings and management plan with the patient and family members present at the time of this visit.  Fady Fox M.D., Uveitis and Medical Retina, June 27, 2022     Again, thank you for allowing me to participate in the care of your patient.    Sincerely,    Fady Fox MD  [facility] Dept of  Ophthalmology  Uveitis and Medical Retina

## 2022-06-27 NOTE — PROGRESS NOTES
Chief Complaint/Presenting Concern:  Uveitis follow up    Interval History of Present Ocular Illness:  Roseann Garcia is a 36 year old patient who returns for follow up of her nodular scleritis of the right eye. At our last visit visit in December 2021, we recommended a Medrol dose pack for the flare of scleritis right eye. Roseann reports things had been doing well until a week ago or so, when the right eye started flaring up. As below, she had planned to be off methotrexate and Enbrel for tummy tuck. We sent a Medrol dose pack    Interval Updates to Medical/Family/Social History:    Had Tummy Tuck a few weeks ago, and held Enbrel few weeks ago    Relevant Review of Systems Updates:  Skin healing well, no joint issues.     No recent labs     Current eye related medications: No steroid drops, No combigan and no ciprofloxacin, Medrol dose pack (day 3 of 6 completed), off Enbrel and Methotrexate for the last 3 weeks (until recovering from surgery),    Retina/Uveitis Imaging: None today    Assessment:     1. Nodular scleritis of right eye  Recurred last week. Flat today. Some limbal inflammation, no thinning    2. History of iritis  None in either eye today    3. Ocular hypertension of right eye  Normal off Combigan    4. High risk medication use  Off Enbrel and methotrexate last three weeks (as recovering from Tummy tuck)    Plan/Recommendations:      Discussed findings with patient. The nodule recurred last week, improved on medrol dose pack. There is no thinning and no active nodule today, some limbal inflammation but no epi defect. This is likely due to being off methotrexate and Enbrel for a few weeks as planned for tummy tuck and should improve once these are restarted    There are no internal nodules in the right eye on dilated exam.     The left eye is doing well without inflammation.     Eye pressure is normal off Combigan. We can monitor off this drop as optic nerve without cupping    Recommend additional  testing: None at this time    Given that Enbrel and Methotrexate had been used without scleritis flares until last week, would not recommend any specific changes recommended. Recommend restarting Methotrexate and Enbrel once your surgeon feels safe.    No steroid drop, antibiotic drop, or combigan needed    Finish up the Medrol pack as directed. We sent refills to be used if needed. Things should improve once the enbrel and methotrexate are restarted. If not better by 2nd week of July, please let us know    Recommend follow up with Dr. Graeme coy.     RTC 6 months tonopen, dilate right eye, and RNFL (ordered)    Physician Attestation     Attending Physician Attestation:  Complete documentation of historical and exam elements from today's encounter can be found in the full encounter summary report (not reduplicated in this progress note). I personally obtained the chief complaint(s) and history of present illness. I confirmed and edited as necessary the review of systems, past medical/surgical history, family history, social history, and examination findings as documented by others; and I examined the patient myself. I personally reviewed the relevant tests, images, and reports as documented above. I formulated and edited as necessary the assessment and plan and discussed the findings and management plan with the patient and family members present at the time of this visit.  Fady Fox M.D., Uveitis and Medical Retina, June 27, 2022

## 2022-06-27 NOTE — PATIENT INSTRUCTIONS
Given that Enbrel and Methotrexate had been used without scleritis flares until last week, would not recommend any specific changes recommended. Recommend restarting Methotrexate and Enbrel once your surgeon feels safe.  No steroid drop, antibiotic drop, or combigan needed  Finish up the Medrol pack as directed. We sent refills to be used if needed. Things should improve once the enbrel and methotrexate are restarted. If not better by 2nd week of July, please let us know

## 2022-09-26 ENCOUNTER — TELEPHONE (OUTPATIENT)
Dept: OPHTHALMOLOGY | Facility: CLINIC | Age: 37
End: 2022-09-26

## 2022-09-26 DIAGNOSIS — H15.091 NODULAR SCLERITIS OF RIGHT EYE: ICD-10-CM

## 2022-09-26 RX ORDER — METHYLPREDNISOLONE 4 MG
TABLET, DOSE PACK ORAL
Qty: 21 TABLET | Refills: 0 | Status: ON HOLD | OUTPATIENT
Start: 2022-09-26 | End: 2024-08-27

## 2022-09-26 NOTE — TELEPHONE ENCOUNTER
[facility] Call Center    Phone Message    May a detailed message be left on voicemail: yes     Reason for Call: Medication Refill Request    Has the patient contacted the pharmacy for the refill? Yes   Name of medication being requested: methylPREDNISolone (MEDROL DOSEPAK) 4 MG tablet therapy pack    Provider who prescribed the medication: Dr. Fox.  Pharmacy: [id number] - [place], [place] - [address]  Date medication is needed: ASAP.     Patient never picked up the script that was sent back in June and the pharmacy ended up canceling. Please resend.     Action Taken: Other: eye    Travel Screening: Not Applicable

## 2022-09-26 NOTE — TELEPHONE ENCOUNTER
Called and spoke to Roseann     Per Dr. Ruddy Figueroa: I am sending 1 a single Medrol Dosepak without refills.  Please instruct the patient to call us if things are not better after using it for the 6 days    Pt is ok with this plan and will call if symptoms are worse

## 2022-09-26 NOTE — TELEPHONE ENCOUNTER
Received request to send refill on Medrol Dosepak.  There was a note that the medication was not needed in June 2022 and this was canceled. Sent prescription for Medrol Dosepak with instructions to call us if things are not better after its use

## 2023-12-01 ENCOUNTER — TRANSCRIBE ORDERS (OUTPATIENT)
Dept: OTHER | Age: 38
End: 2023-12-01

## 2023-12-01 DIAGNOSIS — N32.81 OVERACTIVE BLADDER: Primary | ICD-10-CM

## 2023-12-07 ENCOUNTER — THERAPY VISIT (OUTPATIENT)
Dept: PHYSICAL THERAPY | Facility: CLINIC | Age: 38
End: 2023-12-07
Attending: STUDENT IN AN ORGANIZED HEALTH CARE EDUCATION/TRAINING PROGRAM
Payer: COMMERCIAL

## 2023-12-07 DIAGNOSIS — N32.81 OVERACTIVE BLADDER: ICD-10-CM

## 2023-12-07 PROCEDURE — 97530 THERAPEUTIC ACTIVITIES: CPT | Mod: GP

## 2023-12-07 PROCEDURE — 97161 PT EVAL LOW COMPLEX 20 MIN: CPT | Mod: GP

## 2023-12-07 PROCEDURE — 97535 SELF CARE MNGMENT TRAINING: CPT | Mod: 59

## 2023-12-07 PROCEDURE — 97110 THERAPEUTIC EXERCISES: CPT | Mod: 59

## 2023-12-07 NOTE — PROGRESS NOTES
PHYSICAL THERAPY EVALUATION  Type of Visit: Evaluation    See electronic medical record for Abuse and Falls Screening details.    Subjective       Presenting condition or subjective complaint:   urinary frequency and leakage and occasional leakage and been deal with most of her life and got worse the last few years. Always have to locate the bathroom anytime she gets somewhere. Pt reports leaking with exercising - lifting, [employer], bodyweight activities which started within the last year.  Date of onset: 12/07/20    Relevant medical history:     Dates & types of surgery:      Prior diagnostic imaging/testing results:       Prior therapy history for the same diagnosis, illness or injury:            Employment:     personal care assistant  Hobbies/Interests:  exercise    Patient goals for therapy:  stop leaking bladder    Pain assessment:  low back pain - pt thinks she has sciatica     Objective      PELVIC EVALUATION  ADDITIONAL HISTORY:  Sex assigned at birth:  female  Gender identity:    female  Pronouns:    she/her/hers    Bladder History:  Feels bladder filling:  yes  Triggers for feeling of inability to wait to go to the bathroom:    yes - getting to new location - store, home, etc  How long can you wait to urinate:   moments  Gets up at night to urinate:    8x  Can stop the flow of urine when urinating:  sometimes  Volume of urine usually released:   medium  Other issues:  none  Number of bladder infections in last 12 months:   UTIs and 1-2 within the last year  Fluid intake per day:      2-3 16oz water bottles, 2-3 sodas, 3-4 drinks of alcohol a few days a week  Medications taken for bladder:     yes  Activities causing urine leak:    cough, sneeze, jump, run, hurry to bathroom.   Amount of urine typically leaked:  medium  Pads used to help with leaking:      no    Bowel History:  Frequency of bowel movement:  2x/day  Consistency of stool:    soft  Ignores the urge to defecate:  sometimes  Other bowel  issues:    Length of time spent trying to have a bowel movement:      Sexual Function History:  Sexual orientation:    straight  Sexually active:  yes  Lubrication used:      Pelvic pain:      Pain or difficulty with orgasms/erection/ejaculation:      State of menopause:    Hormone medications:    no       2 previous pregnancies, 1 previous delivery, vaginal delivery    Discussed reason for referral regarding pelvic health needs and external/internal pelvic floor muscle examination with patient/guardian.  Opportunity provided to ask questions and verbal consent for assessment and intervention was given.    PAIN: Pain Level at Rest: 0/10  Pain Level with Use: 7/10  Pain Location: lumbar spine and into glute  Pain Quality: Throbbing  Pain Frequency: intermittent  Pain is Worst: nighttime  Pain is Exacerbated By: spontaneous  Pain is Relieved By: NSAIDs and laying on floor  POSTURE: WFL  LUMBAR SCREEN:  side glide to R increases pain, all other WFL  HIP SCREEN:  Strength:   Pain: - none + mild ++ moderate +++ severe  Strength Scale: 0-5/5 Left Right   Hip Flexion 5 5   Hip Abduction 3+ 3+   Hip Internal Rotation 4+ 4+   Hip External Rotation 3+ 3+   Knee Flexion 5 5   Knee Extension 5 5    Hip mobility: slight decrease IR on L, flexion limited - likely due to pt tight clothing  Functional Strength Testing: Double Leg Squat: Good technique/no significant findings  Joint mobility: decreased joint mobility, PA decreases pain      PELVIC EXAM  To be performed at next visit      Assessment & Plan   CLINICAL IMPRESSIONS  Medical Diagnosis: overactive bladder    Treatment Diagnosis: mixed urinary incontinence and LBP   Impression/Assessment: Patient is a 37 year old female with mixed urinary incontinence and low back pain complaints.  The following significant findings have been identified: Pain, Decreased ROM/flexibility, Decreased joint mobility, Decreased strength, Impaired muscle performance, and Decreased activity  tolerance. These impairments interfere with their ability to perform self care tasks, work tasks, and recreational activities as compared to previous level of function.     Clinical Decision Making (Complexity):  Clinical Presentation: Stable/Uncomplicated  Clinical Presentation Rationale: based on medical and personal factors listed in PT evaluation  Clinical Decision Making (Complexity): Low complexity    PLAN OF CARE  Treatment Interventions:  Modalities: Biofeedback, Dry Needling, E-stim, Hot Pack, Ultrasound  Interventions: Manual Therapy, Neuromuscular Re-education, Therapeutic Activity, Therapeutic Exercise, Self-Care/Home Management    Long Term Goals     PT Goal 1  Goal Identifier: uinary incontinence  Goal Description: pt will demonstrate improvement in pelvic floor muscle strength and coordination to reduce leakage events to <1x/week during functional activities and reduce pad usage to none.  Rationale: to maximize safety and independence with performance of ADLs and functional tasks  Target Date: 02/29/24  PT Goal 2  Goal Identifier: nocturia  Goal Description: patient will reduce nocturia episodes from waking up 8 times per night to waking up 3 times per night.  In 12 weeks patient will decrease to 1x/night.  Rationale: to maximize safety and independence with performance of ADLs and functional tasks  Target Date: 02/29/24      Frequency of Treatment: 1x/week for 3 weeks then 1x every 3 weeks for 9 weeks  Duration of Treatment: 12 weeks    Recommended Referrals to Other Professionals: Physical Therapy  Education Assessment:   Learner/Method: Patient    Risks and benefits of evaluation/treatment have been explained.   Patient/Family/caregiver agrees with Plan of Care.     Evaluation Time:     PT Eval, Low Complexity Minutes (57022): 25       Signing Clinician: Miranda Her PT          [facility]                                                                                    OUTPATIENT PHYSICAL THERAPY    PLAN OF TREATMENT FOR OUTPATIENT REHABILITATION   Patient's Last Name, First Name, Roseann Betancourt YOB: 1985   Provider's Name   [facility]   Medical Record No.  [id number]     Onset Date: 12/07/20  Start of Care Date: 12/07/23     Medical Diagnosis:  overactive bladder      PT Treatment Diagnosis:  mixed urinary incontinence and LBP Plan of Treatment  Frequency/Duration: 1x/week for 3 weeks then 1x every 3 weeks for 9 weeks/ 12 weeks    Certification date from 12/07/23 to 02/29/24         See note for plan of treatment details and functional goals     Miranda Her, PT                         I CERTIFY THE NEED FOR THESE SERVICES FURNISHED UNDER        THIS PLAN OF TREATMENT AND WHILE UNDER MY CARE .             Physician Signature               Date    X_____________________________________________________                  Referring Provider:  Marleny Arias    Initial Assessment  See Epic Evaluation- Start of Care Date: 12/07/23

## 2024-04-30 PROBLEM — N32.81 OVERACTIVE BLADDER: Status: RESOLVED | Noted: 2023-12-07 | Resolved: 2024-04-30

## 2024-04-30 NOTE — PROGRESS NOTES
12/07/23 0500   Appointment Info   Signing clinician's name / credentials Miranda Her, PT, DPT   Total/Authorized Visits E+T   Visits Used 1   Medical Diagnosis overactive bladder   PT Tx Diagnosis mixed urinary incontinence and LBP   Quick Adds Certification   Progress Note/Certification   Start of Care Date 12/07/23   Onset of illness/injury or Date of Surgery 12/07/20   Therapy Frequency 1x/week for 3 weeks then 1x every 3 weeks for 9 weeks   Predicted Duration 12 weeks   Certification date from 12/07/23   Certification date to 02/29/24   Progress Note Due Date 02/29/24   Progress Note Completed Date 12/07/23   GOALS   PT Goals 2   PT Goal 1   Goal Identifier uinary incontinence   Goal Description pt will demonstrate improvement in pelvic floor muscle strength and coordination to reduce leakage events to <1x/week during functional activities and reduce pad usage to none.   Rationale to maximize safety and independence with performance of ADLs and functional tasks   Target Date 02/29/24   PT Goal 2   Goal Identifier nocturia   Goal Description patient will reduce nocturia episodes from waking up 8 times per night to waking up 3 times per night.  In 12 weeks patient will decrease to 1x/night.   Rationale to maximize safety and independence with performance of ADLs and functional tasks   Target Date 02/29/24   Subjective Report   Subjective Report see eval   Treatment Interventions (PT)   Interventions Therapeutic Procedure/Exercise;Self Care/Home Management;Therapeutic Activity   Therapeutic Procedure/Exercise   Therapeutic Procedures: strength, endurance, ROM, flexibility minutes (25693) 10   Skilled Intervention cueing for dosing and technique   Patient Response/Progress pt demonstrated and verbalized understanding   PTRx Ther Proc 1 Prone Press Ups   PTRx Ther Proc 1 - Details x10   PTRx Ther Proc 2 Thoracic Extension   PTRx Ther Proc 2 - Details x10   PTRx Ther Proc 3 Standing Extension   PTRx Ther Proc  3 - Details x10   PTRx Ther Proc 4 side lying hip abduction   PTRx Ther Proc 4 - Details reviewed exercises pt is already doing and added sidelying hip abduction   Therapeutic Activity   Therapeutic Activities: dynamic activities to improve functional performance minutes (70785) 10   Therapeutic Activities Ther Act 2;Ther Act 3;Ther Act 4   Skilled Intervention education on urge suppression   Patient Response/Progress pt verbalized understanding   Ther Act 2 Urge Suppression Techniques   Ther Act 2 - Details Understanding urges, Patient education about what urges are and their nature. Recognizing triggers that lead to unwanted behaviors. Reducing stress and anxiety through relaxation techniques such as deep breathing. Distracting attention towards another engaging activity to reduce urge.   Self Care/home Management   ADL/Home Mgmt Training (01686) 25   Self Care Self Care 2;Self Care 3;Self Care 4   Self Care 1 education in pelvic floor anatomy prior to examination   Self Care 1 - Details use of pelvic model and pictures to educate on pelvic muscle function and anatomy. options for pelvic floor assessment including external visualization and or palpation, internal exam, biofeedback, pt offerred ability to consent or defer   Skilled Intervention Education in bladder function, muscle function, patient consent, and self care techniques   Patient Response/Progress pt verbalized understanding   Self Care 2 bladder diary   Self Care 2 - Details pt given bladder diary and educated on the purpose. pt to fill out voiding frequency, urgency, fluid and food intake, leakage events and amount. To be reviewed at next visit to determine behavioral modifications needed   Self Care 3 General bladder education   Self Care 3 - Details used photos/model, education provided on normal voiding patterns within 2-4 hour cycle including muscle functions and synergies.  Provided list of potential bladder irritants with education that these  "do not effect everyone the same. Education in \"normal\" fluid recommendations to be 1/2 body weight in ounces with >70% of this to be water, but mostly determind by light lemonade color of urine.   Eval/Assessments   PT Eval, Low Complexity Minutes (53706) 20   Education   Learner/Method Patient   Plan   Home program ptrx printout   Plan for next session internal vaginal exam, quick flicks, knack/yates   Comments   Comments pt arrived 15 minutes late   Total Session Time   Timed Code Treatment Minutes 45   Total Treatment Time (sum of timed and untimed services) 65           DISCHARGE  Reason for Discharge: Patient has failed to schedule further appointments.    Equipment Issued: HEP    Discharge Plan: Patient to continue home program.    Referring Provider:  Marleny Arias    "

## 2024-07-20 ENCOUNTER — HEALTH MAINTENANCE LETTER (OUTPATIENT)
Age: 39
End: 2024-07-20

## 2024-08-21 RX ORDER — ADALIMUMAB 40MG/0.4ML
40 KIT SUBCUTANEOUS
COMMUNITY

## 2024-08-21 RX ORDER — IBUPROFEN 600 MG/1
600 TABLET, FILM COATED ORAL EVERY 6 HOURS PRN
COMMUNITY

## 2024-08-27 ENCOUNTER — ANESTHESIA (OUTPATIENT)
Dept: SURGERY | Facility: CLINIC | Age: 39
End: 2024-08-27
Payer: COMMERCIAL

## 2024-08-27 ENCOUNTER — ANESTHESIA EVENT (OUTPATIENT)
Dept: SURGERY | Facility: CLINIC | Age: 39
End: 2024-08-27
Payer: COMMERCIAL

## 2024-08-27 ENCOUNTER — HOSPITAL ENCOUNTER (OUTPATIENT)
Facility: CLINIC | Age: 39
Discharge: HOME | End: 2024-08-27
Attending: PLASTIC SURGERY | Admitting: PLASTIC SURGERY
Payer: COMMERCIAL

## 2024-08-27 ENCOUNTER — HOSPITAL ENCOUNTER (OUTPATIENT)
Facility: CLINIC | Age: 39
End: 2024-08-27
Attending: PLASTIC SURGERY | Admitting: PLASTIC SURGERY
Payer: COMMERCIAL

## 2024-08-27 VITALS
TEMPERATURE: 97.8 F | BODY MASS INDEX: 37.84 KG/M2 | HEART RATE: 57 BPM | OXYGEN SATURATION: 95 % | WEIGHT: 264.3 LBS | DIASTOLIC BLOOD PRESSURE: 72 MMHG | RESPIRATION RATE: 14 BRPM | SYSTOLIC BLOOD PRESSURE: 119 MMHG | HEIGHT: 70 IN

## 2024-08-27 DIAGNOSIS — N62 HYPERTROPHY OF BREAST: Primary | ICD-10-CM

## 2024-08-27 LAB — HCG UR QL: NEGATIVE

## 2024-08-27 PROCEDURE — 88305 TISSUE EXAM BY PATHOLOGIST: CPT | Mod: 26 | Performed by: PATHOLOGY

## 2024-08-27 PROCEDURE — 370N000017 HC ANESTHESIA TECHNICAL FEE, PER MIN: Performed by: PLASTIC SURGERY

## 2024-08-27 PROCEDURE — 19318 BREAST REDUCTION: CPT | Performed by: ANESTHESIOLOGY

## 2024-08-27 PROCEDURE — 250N000013 HC RX MED GY IP 250 OP 250 PS 637: Performed by: PLASTIC SURGERY

## 2024-08-27 PROCEDURE — 250N000025 HC SEVOFLURANE, PER MIN: Performed by: PLASTIC SURGERY

## 2024-08-27 PROCEDURE — 250N000011 HC RX IP 250 OP 636: Performed by: NURSE ANESTHETIST, CERTIFIED REGISTERED

## 2024-08-27 PROCEDURE — 250N000011 HC RX IP 250 OP 636: Performed by: ANESTHESIOLOGY

## 2024-08-27 PROCEDURE — 250N000009 HC RX 250: Performed by: NURSE ANESTHETIST, CERTIFIED REGISTERED

## 2024-08-27 PROCEDURE — 88305 TISSUE EXAM BY PATHOLOGIST: CPT | Mod: TC | Performed by: PLASTIC SURGERY

## 2024-08-27 PROCEDURE — 999N000141 HC STATISTIC PRE-PROCEDURE NURSING ASSESSMENT: Performed by: PLASTIC SURGERY

## 2024-08-27 PROCEDURE — 250N000011 HC RX IP 250 OP 636: Performed by: PLASTIC SURGERY

## 2024-08-27 PROCEDURE — 710N000010 HC RECOVERY PHASE 1, LEVEL 2, PER MIN: Performed by: PLASTIC SURGERY

## 2024-08-27 PROCEDURE — 19318 BREAST REDUCTION: CPT | Performed by: NURSE ANESTHETIST, CERTIFIED REGISTERED

## 2024-08-27 PROCEDURE — 360N000076 HC SURGERY LEVEL 3, PER MIN: Performed by: PLASTIC SURGERY

## 2024-08-27 PROCEDURE — 710N000012 HC RECOVERY PHASE 2, PER MINUTE: Performed by: PLASTIC SURGERY

## 2024-08-27 PROCEDURE — 81025 URINE PREGNANCY TEST: CPT | Performed by: ANESTHESIOLOGY

## 2024-08-27 PROCEDURE — 258N000003 HC RX IP 258 OP 636: Performed by: NURSE ANESTHETIST, CERTIFIED REGISTERED

## 2024-08-27 PROCEDURE — 272N000001 HC OR GENERAL SUPPLY STERILE: Performed by: PLASTIC SURGERY

## 2024-08-27 PROCEDURE — 250N000009 HC RX 250: Performed by: PLASTIC SURGERY

## 2024-08-27 RX ORDER — ACETAMINOPHEN 325 MG/1
650 TABLET ORAL EVERY 4 HOURS PRN
Status: CANCELLED | OUTPATIENT
Start: 2024-08-30

## 2024-08-27 RX ORDER — OXYCODONE HYDROCHLORIDE 5 MG/1
5-10 TABLET ORAL 4 TIMES DAILY PRN
Qty: 20 TABLET | Refills: 0 | Status: SHIPPED | OUTPATIENT
Start: 2024-08-27 | End: 2024-09-01

## 2024-08-27 RX ORDER — CEFAZOLIN SODIUM/WATER 2 G/20 ML
2 SYRINGE (ML) INTRAVENOUS EVERY 8 HOURS
Status: CANCELLED | OUTPATIENT
Start: 2024-08-27 | End: 2024-08-28

## 2024-08-27 RX ORDER — LIDOCAINE 40 MG/G
CREAM TOPICAL
Status: CANCELLED | OUTPATIENT
Start: 2024-08-27

## 2024-08-27 RX ORDER — AMOXICILLIN 250 MG
1 CAPSULE ORAL 2 TIMES DAILY
Status: CANCELLED | OUTPATIENT
Start: 2024-08-27

## 2024-08-27 RX ORDER — HYDROMORPHONE HCL IN WATER/PF 6 MG/30 ML
0.2 PATIENT CONTROLLED ANALGESIA SYRINGE INTRAVENOUS EVERY 5 MIN PRN
Status: DISCONTINUED | OUTPATIENT
Start: 2024-08-27 | End: 2024-08-27 | Stop reason: HOSPADM

## 2024-08-27 RX ORDER — DEXAMETHASONE SODIUM PHOSPHATE 4 MG/ML
INJECTION, SOLUTION INTRA-ARTICULAR; INTRALESIONAL; INTRAMUSCULAR; INTRAVENOUS; SOFT TISSUE PRN
Status: DISCONTINUED | OUTPATIENT
Start: 2024-08-27 | End: 2024-08-27

## 2024-08-27 RX ORDER — BUPIVACAINE HYDROCHLORIDE 2.5 MG/ML
INJECTION, SOLUTION INFILTRATION; PERINEURAL PRN
Status: DISCONTINUED | OUTPATIENT
Start: 2024-08-27 | End: 2024-08-27 | Stop reason: HOSPADM

## 2024-08-27 RX ORDER — CEFAZOLIN SODIUM/WATER 2 G/20 ML
2 SYRINGE (ML) INTRAVENOUS SEE ADMIN INSTRUCTIONS
Status: DISCONTINUED | OUTPATIENT
Start: 2024-08-27 | End: 2024-08-27 | Stop reason: HOSPADM

## 2024-08-27 RX ORDER — HYDROMORPHONE HCL IN WATER/PF 6 MG/30 ML
0.4 PATIENT CONTROLLED ANALGESIA SYRINGE INTRAVENOUS EVERY 5 MIN PRN
Status: DISCONTINUED | OUTPATIENT
Start: 2024-08-27 | End: 2024-08-27 | Stop reason: HOSPADM

## 2024-08-27 RX ORDER — PROPOFOL 10 MG/ML
INJECTION, EMULSION INTRAVENOUS CONTINUOUS PRN
Status: DISCONTINUED | OUTPATIENT
Start: 2024-08-27 | End: 2024-08-27

## 2024-08-27 RX ORDER — LIDOCAINE HYDROCHLORIDE 20 MG/ML
INJECTION, SOLUTION INFILTRATION; PERINEURAL PRN
Status: DISCONTINUED | OUTPATIENT
Start: 2024-08-27 | End: 2024-08-27

## 2024-08-27 RX ORDER — DEXAMETHASONE SODIUM PHOSPHATE 4 MG/ML
4 INJECTION, SOLUTION INTRA-ARTICULAR; INTRALESIONAL; INTRAMUSCULAR; INTRAVENOUS; SOFT TISSUE
Status: DISCONTINUED | OUTPATIENT
Start: 2024-08-27 | End: 2024-08-27 | Stop reason: HOSPADM

## 2024-08-27 RX ORDER — OXYCODONE HYDROCHLORIDE 5 MG/1
5-10 TABLET ORAL ONCE
Status: COMPLETED | OUTPATIENT
Start: 2024-08-27 | End: 2024-08-27

## 2024-08-27 RX ORDER — POLYETHYLENE GLYCOL 3350 17 G/17G
17 POWDER, FOR SOLUTION ORAL DAILY
Status: CANCELLED | OUTPATIENT
Start: 2024-08-28

## 2024-08-27 RX ORDER — PROCHLORPERAZINE MALEATE 10 MG
10 TABLET ORAL EVERY 6 HOURS PRN
Status: CANCELLED | OUTPATIENT
Start: 2024-08-27

## 2024-08-27 RX ORDER — MAGNESIUM HYDROXIDE 1200 MG/15ML
LIQUID ORAL PRN
Status: DISCONTINUED | OUTPATIENT
Start: 2024-08-27 | End: 2024-08-27 | Stop reason: HOSPADM

## 2024-08-27 RX ORDER — KETOROLAC TROMETHAMINE 30 MG/ML
30 INJECTION, SOLUTION INTRAMUSCULAR; INTRAVENOUS ONCE
Status: COMPLETED | OUTPATIENT
Start: 2024-08-27 | End: 2024-08-27

## 2024-08-27 RX ORDER — PROPOFOL 10 MG/ML
INJECTION, EMULSION INTRAVENOUS PRN
Status: DISCONTINUED | OUTPATIENT
Start: 2024-08-27 | End: 2024-08-27

## 2024-08-27 RX ORDER — HYDROXYZINE HYDROCHLORIDE 25 MG/1
25 TABLET, FILM COATED ORAL EVERY 6 HOURS PRN
Status: CANCELLED | OUTPATIENT
Start: 2024-08-27

## 2024-08-27 RX ORDER — NALOXONE HYDROCHLORIDE 0.4 MG/ML
0.1 INJECTION, SOLUTION INTRAMUSCULAR; INTRAVENOUS; SUBCUTANEOUS
Status: DISCONTINUED | OUTPATIENT
Start: 2024-08-27 | End: 2024-08-27 | Stop reason: HOSPADM

## 2024-08-27 RX ORDER — ONDANSETRON 2 MG/ML
INJECTION INTRAMUSCULAR; INTRAVENOUS PRN
Status: DISCONTINUED | OUTPATIENT
Start: 2024-08-27 | End: 2024-08-27

## 2024-08-27 RX ORDER — HYDROMORPHONE HCL IN WATER/PF 6 MG/30 ML
0.4 PATIENT CONTROLLED ANALGESIA SYRINGE INTRAVENOUS
Status: CANCELLED | OUTPATIENT
Start: 2024-08-27

## 2024-08-27 RX ORDER — SODIUM CHLORIDE, SODIUM LACTATE, POTASSIUM CHLORIDE, CALCIUM CHLORIDE 600; 310; 30; 20 MG/100ML; MG/100ML; MG/100ML; MG/100ML
INJECTION, SOLUTION INTRAVENOUS CONTINUOUS
Status: CANCELLED | OUTPATIENT
Start: 2024-08-27

## 2024-08-27 RX ORDER — BUPIVACAINE HYDROCHLORIDE 2.5 MG/ML
INJECTION, SOLUTION EPIDURAL; INFILTRATION; INTRACAUDAL
Status: DISCONTINUED
Start: 2024-08-27 | End: 2024-08-27 | Stop reason: HOSPADM

## 2024-08-27 RX ORDER — ACETAMINOPHEN 500 MG
1000 TABLET ORAL ONCE
Status: COMPLETED | OUTPATIENT
Start: 2024-08-27 | End: 2024-08-27

## 2024-08-27 RX ORDER — OXYCODONE HYDROCHLORIDE 5 MG/1
10 TABLET ORAL EVERY 4 HOURS PRN
Status: CANCELLED | OUTPATIENT
Start: 2024-08-27

## 2024-08-27 RX ORDER — ONDANSETRON 4 MG/1
4 TABLET, ORALLY DISINTEGRATING ORAL EVERY 6 HOURS PRN
Status: CANCELLED | OUTPATIENT
Start: 2024-08-27

## 2024-08-27 RX ORDER — SODIUM CHLORIDE, SODIUM LACTATE, POTASSIUM CHLORIDE, CALCIUM CHLORIDE 600; 310; 30; 20 MG/100ML; MG/100ML; MG/100ML; MG/100ML
INJECTION, SOLUTION INTRAVENOUS CONTINUOUS
Status: DISCONTINUED | OUTPATIENT
Start: 2024-08-27 | End: 2024-08-27 | Stop reason: HOSPADM

## 2024-08-27 RX ORDER — ACETAMINOPHEN 325 MG/1
975 TABLET ORAL EVERY 8 HOURS
Status: CANCELLED | OUTPATIENT
Start: 2024-08-27 | End: 2024-08-30

## 2024-08-27 RX ORDER — FENTANYL CITRATE 0.05 MG/ML
25 INJECTION, SOLUTION INTRAMUSCULAR; INTRAVENOUS EVERY 5 MIN PRN
Status: DISCONTINUED | OUTPATIENT
Start: 2024-08-27 | End: 2024-08-27 | Stop reason: HOSPADM

## 2024-08-27 RX ORDER — FENTANYL CITRATE 0.05 MG/ML
50 INJECTION, SOLUTION INTRAMUSCULAR; INTRAVENOUS EVERY 5 MIN PRN
Status: DISCONTINUED | OUTPATIENT
Start: 2024-08-27 | End: 2024-08-27 | Stop reason: HOSPADM

## 2024-08-27 RX ORDER — SODIUM CHLORIDE, SODIUM LACTATE, POTASSIUM CHLORIDE, CALCIUM CHLORIDE 600; 310; 30; 20 MG/100ML; MG/100ML; MG/100ML; MG/100ML
INJECTION, SOLUTION INTRAVENOUS CONTINUOUS PRN
Status: DISCONTINUED | OUTPATIENT
Start: 2024-08-27 | End: 2024-08-27

## 2024-08-27 RX ORDER — HYDROMORPHONE HCL IN WATER/PF 6 MG/30 ML
0.2 PATIENT CONTROLLED ANALGESIA SYRINGE INTRAVENOUS
Status: CANCELLED | OUTPATIENT
Start: 2024-08-27

## 2024-08-27 RX ORDER — ONDANSETRON 2 MG/ML
4 INJECTION INTRAMUSCULAR; INTRAVENOUS EVERY 6 HOURS PRN
Status: CANCELLED | OUTPATIENT
Start: 2024-08-27

## 2024-08-27 RX ORDER — CEFAZOLIN SODIUM/WATER 2 G/20 ML
2 SYRINGE (ML) INTRAVENOUS
Status: COMPLETED | OUTPATIENT
Start: 2024-08-27 | End: 2024-08-27

## 2024-08-27 RX ORDER — FENTANYL CITRATE 50 UG/ML
INJECTION, SOLUTION INTRAMUSCULAR; INTRAVENOUS PRN
Status: DISCONTINUED | OUTPATIENT
Start: 2024-08-27 | End: 2024-08-27

## 2024-08-27 RX ORDER — ONDANSETRON 2 MG/ML
4 INJECTION INTRAMUSCULAR; INTRAVENOUS EVERY 30 MIN PRN
Status: DISCONTINUED | OUTPATIENT
Start: 2024-08-27 | End: 2024-08-27 | Stop reason: HOSPADM

## 2024-08-27 RX ORDER — ONDANSETRON 4 MG/1
4 TABLET, ORALLY DISINTEGRATING ORAL EVERY 30 MIN PRN
Status: DISCONTINUED | OUTPATIENT
Start: 2024-08-27 | End: 2024-08-27 | Stop reason: HOSPADM

## 2024-08-27 RX ORDER — OXYCODONE HYDROCHLORIDE 5 MG/1
5 TABLET ORAL EVERY 4 HOURS PRN
Status: CANCELLED | OUTPATIENT
Start: 2024-08-27

## 2024-08-27 RX ORDER — BISACODYL 10 MG
10 SUPPOSITORY, RECTAL RECTAL DAILY PRN
Status: CANCELLED | OUTPATIENT
Start: 2024-08-30

## 2024-08-27 RX ORDER — LORAZEPAM 2 MG/ML
0.5 INJECTION INTRAMUSCULAR EVERY 6 HOURS PRN
Status: CANCELLED | OUTPATIENT
Start: 2024-08-27

## 2024-08-27 RX ADMIN — ACETAMINOPHEN 1000 MG: 500 TABLET, FILM COATED ORAL at 16:06

## 2024-08-27 RX ADMIN — PROPOFOL 200 MG: 10 INJECTION, EMULSION INTRAVENOUS at 17:47

## 2024-08-27 RX ADMIN — DEXAMETHASONE SODIUM PHOSPHATE 4 MG: 4 INJECTION, SOLUTION INTRA-ARTICULAR; INTRALESIONAL; INTRAMUSCULAR; INTRAVENOUS; SOFT TISSUE at 18:00

## 2024-08-27 RX ADMIN — PROPOFOL 150 MCG/KG/MIN: 10 INJECTION, EMULSION INTRAVENOUS at 17:47

## 2024-08-27 RX ADMIN — KETOROLAC TROMETHAMINE 30 MG: 30 INJECTION, SOLUTION INTRAMUSCULAR at 21:16

## 2024-08-27 RX ADMIN — FENTANYL CITRATE 50 MCG: 50 INJECTION INTRAMUSCULAR; INTRAVENOUS at 17:47

## 2024-08-27 RX ADMIN — HYDROMORPHONE HYDROCHLORIDE 0.5 MG: 1 INJECTION, SOLUTION INTRAMUSCULAR; INTRAVENOUS; SUBCUTANEOUS at 18:25

## 2024-08-27 RX ADMIN — SODIUM CHLORIDE, POTASSIUM CHLORIDE, SODIUM LACTATE AND CALCIUM CHLORIDE: 600; 310; 30; 20 INJECTION, SOLUTION INTRAVENOUS at 17:40

## 2024-08-27 RX ADMIN — HYDROMORPHONE HYDROCHLORIDE 0.4 MG: 0.2 INJECTION, SOLUTION INTRAMUSCULAR; INTRAVENOUS; SUBCUTANEOUS at 20:54

## 2024-08-27 RX ADMIN — HYDROMORPHONE HYDROCHLORIDE 0.4 MG: 0.2 INJECTION, SOLUTION INTRAMUSCULAR; INTRAVENOUS; SUBCUTANEOUS at 21:18

## 2024-08-27 RX ADMIN — PHENYLEPHRINE HYDROCHLORIDE 100 MCG: 10 INJECTION INTRAVENOUS at 17:50

## 2024-08-27 RX ADMIN — FENTANYL CITRATE 50 MCG: 50 INJECTION, SOLUTION INTRAMUSCULAR; INTRAVENOUS at 20:38

## 2024-08-27 RX ADMIN — FENTANYL CITRATE 50 MCG: 50 INJECTION, SOLUTION INTRAMUSCULAR; INTRAVENOUS at 20:31

## 2024-08-27 RX ADMIN — OXYCODONE HYDROCHLORIDE 10 MG: 5 TABLET ORAL at 21:27

## 2024-08-27 RX ADMIN — HYDROMORPHONE HYDROCHLORIDE 0.5 MG: 1 INJECTION, SOLUTION INTRAMUSCULAR; INTRAVENOUS; SUBCUTANEOUS at 18:07

## 2024-08-27 RX ADMIN — MIDAZOLAM 2 MG: 1 INJECTION INTRAMUSCULAR; INTRAVENOUS at 17:44

## 2024-08-27 RX ADMIN — HYDROMORPHONE HYDROCHLORIDE 0.4 MG: 0.2 INJECTION, SOLUTION INTRAMUSCULAR; INTRAVENOUS; SUBCUTANEOUS at 21:07

## 2024-08-27 RX ADMIN — DEXMEDETOMIDINE HYDROCHLORIDE 8 MCG: 100 INJECTION, SOLUTION INTRAVENOUS at 19:32

## 2024-08-27 RX ADMIN — FENTANYL CITRATE 50 MCG: 50 INJECTION INTRAMUSCULAR; INTRAVENOUS at 17:58

## 2024-08-27 RX ADMIN — ONDANSETRON 4 MG: 2 INJECTION INTRAMUSCULAR; INTRAVENOUS at 18:00

## 2024-08-27 RX ADMIN — LIDOCAINE HYDROCHLORIDE 100 MG: 20 INJECTION, SOLUTION INFILTRATION; PERINEURAL at 17:47

## 2024-08-27 RX ADMIN — Medication 2 G: at 17:44

## 2024-08-27 RX ADMIN — DEXMEDETOMIDINE HYDROCHLORIDE 12 MCG: 100 INJECTION, SOLUTION INTRAVENOUS at 18:30

## 2024-08-27 ASSESSMENT — ENCOUNTER SYMPTOMS: SEIZURES: 0

## 2024-08-27 ASSESSMENT — ACTIVITIES OF DAILY LIVING (ADL)
ADLS_ACUITY_SCORE: 35

## 2024-08-27 ASSESSMENT — LIFESTYLE VARIABLES: TOBACCO_USE: 1

## 2024-08-27 NOTE — DISCHARGE INSTRUCTIONS
Today you were given 1000 mg of Tylenol at 4:07pm. The recommended daily maximum dose is 4000 mg.       Today you received Toradol, an antiinflammatory medication similar to Ibuprofen.  You should not take other antiinflammatory medication, such as Ibuprofen, Motrin, Advil, Aleve, Naprosyn, etc until 3:20 am.        Same Day Surgery Discharge Instructions for  Sedation and General Anesthesia     It's not unusual to feel dizzy, light-headed or faint for up to 24 hours after surgery or while taking pain medication.  If you have these symptoms: sit for a few minutes before standing and have someone assist you when you get up to walk or use the bathroom.    You should rest and relax for the next 24 hours. We recommend you make arrangements to have an adult stay with you for at least 24 hours after your discharge.  Avoid hazardous and strenuous activity.    DO NOT DRIVE any vehicle or operate mechanical equipment for 24 hours following the end of your surgery.  Even though you may feel normal, your reactions may be affected by the medication you have received.    Do not drink alcoholic beverages for 24 hours following surgery.     Slowly progress to your regular diet as you feel able. It's not unusual to feel nauseated and/or vomit after receiving anesthesia.  If you develop these symptoms, drink clear liquids (apple juice, ginger ale, broth, 7-up, etc. ) until you feel better.  If your nausea and vomiting persists for 24 hours, please notify your surgeon.      All narcotic pain medications, along with inactivity and anesthesia, can cause constipation. Drinking plenty of liquids and increasing fiber intake will help.    For any questions of a medical nature, call your surgeon.    Do not make important decisions for 24 hours.    If you had general anesthesia, you may have a sore throat for a couple of days related to the breathing tube used during surgery.  You may use Cepacol lozenges to help with this discomfort.  If it  worsens or if you develop a fever, contact your surgeon.     If you feel your pain is not well managed with the pain medications prescribed by your surgeon, please contact your surgeon's office to let them know so they can address your concerns.         Reasons to contact your surgeon:    Signs of possible infection: Check your incision daily for redness, swelling, warmth, red streaks or foul drainage.   Elevated temperature.  Pain not controlled with pain medication and/or rest.   Uncontrolled nausea or vomiting.  Any questions or concerns.            **If you have questions or concerns about your procedure,  call Dr. Garcia at [phone]**

## 2024-08-27 NOTE — ANESTHESIA PREPROCEDURE EVALUATION
"Anesthesia Pre-Procedure Evaluation    Patient: Roseann Garcia   MRN: [id number] : 1985        Procedure : Procedure(s):  Mammoplasty, reduction, bilateral          Past Medical History:   Diagnosis Date    Chronic pain of both shoulders     HSIL (high grade squamous intraepithelial lesion) on Pap smear of cervix     Inflammatory arthritis     Nodular scleritis of right eye     Overactive bladder     Severe dysplasia of cervix (CESIA III)     Urinary frequency     Uveitis       Past Surgical History:   Procedure Laterality Date    SKIN GRAFT        No Known Allergies   Social History     Tobacco Use    Smoking status: Never    Smokeless tobacco: Never    Tobacco comments:     Vaping every days, nicotine   Substance Use Topics    Alcohol use: Not Currently      Wt Readings from Last 1 Encounters:   No data found for Wt        Anesthesia Evaluation            ROS/MED HX  ENT/Pulmonary:     (+)     CHAVEZ risk factors,   obese,        tobacco use (nicotine vaping), Current use,                       Neurologic:    (-) no seizures and no CVA   Cardiovascular:       METS/Exercise Tolerance:     Hematologic:       Musculoskeletal:   (+)  arthritis (inflammatory),             GI/Hepatic:       Renal/Genitourinary:       Endo:     (+)               Obesity,    (-) Type II DM and thyroid disease   Psychiatric/Substance Use:       Infectious Disease:       Malignancy:       Other:            Physical Exam    Airway        Mallampati: II   TM distance: > 3 FB   Neck ROM: full   Mouth opening: > 3 cm    Respiratory Devices and Support         Dental       (+) Minor Abnormalities - some fillings, tiny chips      Cardiovascular   cardiovascular exam normal          Pulmonary   pulmonary exam normal                OUTSIDE LABS:  CBC: No results found for: \"WBC\", \"HGB\", \"HCT\", \"PLT\"  BMP: No results found for: \"NA\", \"POTASSIUM\", \"CHLORIDE\", \"CO2\", \"BUN\", \"CR\", \"GLC\"  COAGS: No results found for: \"PTT\", \"INR\", \"FIBR\"  POC: No " "results found for: \"BGM\", \"HCG\", \"HCGS\"  HEPATIC: No results found for: \"ALBUMIN\", \"PROTTOTAL\", \"ALT\", \"AST\", \"GGT\", \"ALKPHOS\", \"BILITOTAL\", \"BILIDIRECT\", \"SJ\"  OTHER: No results found for: \"PH\", \"LACT\", \"A1C\", \"EBOIN\", \"PHOS\", \"MAG\", \"LIPASE\", \"AMYLASE\", \"TSH\", \"T4\", \"T3\", \"CRP\", \"SED\"    Anesthesia Plan    ASA Status:  2    NPO Status:  NPO Appropriate    Anesthesia Type: General.     - Airway: LMA   Induction: Intravenous, Propofol.   Maintenance: Balanced.        Consents    Anesthesia Plan(s) and associated risks, benefits, and realistic alternatives discussed. Questions answered and patient/representative(s) expressed understanding.     - Discussed:     - Discussed with:  Patient      - Extended Intubation/Ventilatory Support Discussed: No.      - Patient is DNR/DNI Status: No     Use of blood products discussed: No .     Postoperative Care    Pain management: IV analgesics, Multi-modal analgesia.   PONV prophylaxis: Ondansetron (or other 5HT-3), Dexamethasone or Solumedrol     Comments:               Titi Mesa MD    I have reviewed the pertinent notes and labs in the chart from the past 30 days and (re)examined the patient.  Any updates or changes from those notes are reflected in this note.                  "

## 2024-08-27 NOTE — ANESTHESIA PROCEDURE NOTES
Airway    Staff -        Anesthesiologist:  Luz Godinez MD       CRNA: Ana Mahajan APRN CRNA       Performed By: CRNAIndications and Patient Condition       Indications for airway management: maddi-procedural       Induction type:intravenous       Mask difficulty assessment: 0 - not attempted    Final Airway Details       Final airway type: supraglottic airway    Supraglottic Airway Details        Type: LMA       Brand: Ambu AuraGain       LMA size: 4    Post intubation assessment        Placement verified by: capnometry, equal breath sounds and chest rise        Number of attempts at approach: 1       Number of other approaches attempted: 0       Secured with: tape       Ease of procedure: easy       Dentition: Intact and Unchanged

## 2024-08-28 NOTE — OR NURSING
Discharge instructions given to patient and her daughter. No questions and concerns. Patient discharge in stable condition.

## 2024-08-28 NOTE — OP NOTE
PLASTIC SURGERY OPERATIVE NOTE  Patient Name:  Roseann Garcia     Date of Service:  8/27/2024     PREOPERATIVE DIAGNOSES:   1.  Macromastia [N62]      POSTOPERATIVE DIAGNOSES:   1.  Macromastia [N62]     SURGEON:  Hansa Garcia MD   OR STAFF:  Circulator: Sherita Bowers RN  Relief Circulator: Jose Belcher RN; Catalina Zimmerman RN  Scrub Person: Jillian Torres; Jean Claude Taylor     Anesthesia:  General     Estimated blood loss:  25 mL    SPECIMEN(S):    ID Type Source Tests Collected by Time Destination   1 : Left Breast Tissue Tissue Breast, Left SURGICAL PATHOLOGY EXAM Hansa Garcia MD 8/27/2024  7:16 PM    2 : right breast tissue Tissue Breast, Right SURGICAL PATHOLOGY EXAM Hansa Garcia MD 8/27/2024  7:55 PM         INDICATIONS: Patient is a 38-year-old significant upper back and neck discomfort secondary to weight of her breast.  She presents today for medically necessary breast reduction procedure.    PROCEDURE:  Bilateral breast reduction     DESCRIPTION OF PROCEDURE:  Roseann Garcia was marked for incision and taken to the operating room.  General anesthesia was administered.  The chest area was prepped and draped in a sterile manner.  On the left side, the nipple areolar complex was resized to 42 mm and medial pedicle was designed with an 8 cm base.  This area was de-epithelialized with the breast under tension.  The medial pedicle was better defined. Remaining skin incisions were made.  Excess breast tissue was removed from the inferior and lateral aspects of the breast.  The breast was reapproximated placing 3-0 monocryl in the subcutaneous tissue 3 cm from the apex.  The medial and lateral pillars were then reapproximated interrupted 2-0 PDS suture. Hemostasis was achieved.      The patient was brought to the upright position and excess skin along the inframammary fold was marked. The new nipple areola complex position was marked.  The patient was return to  the supine position.     On the left side, the excess skin along the inframammary fold was sharply excised and hemostasis was achieved.  The incision was reapproximated with interrupted 3-0 Monocryl in the subcutaneous tissue followed by a running 4-0 subcuticular Monocryl suture.  The nipple areola complex was inset into a 38 mm opening.  The nipple areola complex was matured using interrupted 4-0 monocryl followed a running 5-0 monocryl suture.    On the right side, the nipple areolar complex was resized to 42 mm and medial pedicle was designed with an 8 cm base.  This area was de-epithelialized with the breast under tension.  Remaining skin incisions were made.  The medial pedicle was better defined. Remaining skin incisions were made.  Excess breast tissue was removed from the inferior and lateral aspects of the breast.  The breast was reapproximated placing 3-0 monocryl in the subcutaneous tissue 3 cm from the apex.  The medial and lateral pillars were then reapproximated interrupted 2-0 PDS suture. Hemostasis was achieved.      The patient was brought to the upright position and excess skin along the inframammary fold was marked. The new nipple areola complex position was marked.  The patient was return to the supine position.     On the right side, the excess skin along the inframammary fold was sharply excised and hemostasis was achieved.  The incision was reapproximated with interrupted 3-0 Monocryl in the subcutaneous tissue followed by a running 4-0 subcuticular Monocryl suture.  The nipple areola complex was inset into a 38 mm opening.  The nipple areola complex was matured using interrupted 4-0 monocryl followed by arunning 5-0 monocryl suture.    The incisions were dressing with xeroform and a light gauze and secured with an ace wrap.     FINDINGS: Breast tissue from the left breast weighed 835 grams and from the right breast 1110 grams.      MD [facility] Re[facility]    [phone]

## 2024-08-28 NOTE — PROGRESS NOTES
OK by MICHAEL Godinez and Dr. Garcia to give IV toradol in pacu. Pt tolerating juice and crackers. Oxycodone given for pain.

## 2024-08-28 NOTE — ANESTHESIA POSTPROCEDURE EVALUATION
Patient: Roseann Garcia    Procedure: Procedure(s):  Mammoplasty, reduction, bilateral       Anesthesia Type:  General    Note:     Postop Pain Control: Uneventful            Sign Out: Well controlled pain   PONV: No   Neuro/Psych: Uneventful            Sign Out: Acceptable/Baseline neuro status   Airway/Respiratory: Uneventful            Sign Out: Acceptable/Baseline resp. status   CV/Hemodynamics: Uneventful            Sign Out: Acceptable CV status; No obvious hypovolemia; No obvious fluid overload   Other NRE: NONE   DID A NON-ROUTINE EVENT OCCUR? No           Last vitals:  Vitals Value Taken Time   BP 98/76 08/27/24 2030   Temp 36.2  C (97.1  F) 08/27/24 2030   Pulse 74 08/27/24 2039   Resp 9 08/27/24 2035   SpO2 100 % 08/27/24 2039   Vitals shown include unfiled device data.    Electronically Signed By: Luz Godinez MD  August 27, 2024  8:40 PM

## 2024-08-28 NOTE — ANESTHESIA CARE TRANSFER NOTE
Patient: Roseann Garcia    Procedure: Procedure(s):  Mammoplasty, reduction, bilateral       Diagnosis: Macromastia [N62]  Diagnosis Additional Information: No value filed.    Anesthesia Type:   General     Note:    Oropharynx: oropharynx clear of all foreign objects and spontaneously breathing  Level of Consciousness: awake  Oxygen Supplementation: face mask  Level of Supplemental Oxygen (L/min / FiO2): 6  Independent Airway: airway patency satisfactory and stable  Dentition: dentition unchanged  Vital Signs Stable: post-procedure vital signs reviewed and stable  Report to RN Given: handoff report given  Patient transferred to: PACU    Handoff Report: Identifed the Patient, Identified the Reponsible Provider, Reviewed the pertinent medical history, Discussed the surgical course, Reviewed Intra-OP anesthesia mangement and issues during anesthesia, Set expectations for post-procedure period and Allowed opportunity for questions and acknowledgement of understanding      Vitals:  Vitals Value Taken Time   /56    Temp 36.5 C    Pulse 85 08/27/24 2027   Resp 16 08/27/24 2027   SpO2 98 % 08/27/24 2027   Vitals shown include unfiled device data.    Electronically Signed By: MELVINA Perla CRNA  August 27, 2024  8:28 PM

## 2024-08-29 LAB
PATH REPORT.COMMENTS IMP SPEC: NORMAL
PATH REPORT.COMMENTS IMP SPEC: NORMAL
PATH REPORT.FINAL DX SPEC: NORMAL
PATH REPORT.GROSS SPEC: NORMAL
PATH REPORT.MICROSCOPIC SPEC OTHER STN: NORMAL
PATH REPORT.RELEVANT HX SPEC: NORMAL
PHOTO IMAGE: NORMAL

## 2024-12-10 ENCOUNTER — TELEPHONE (OUTPATIENT)
Dept: OPHTHALMOLOGY | Facility: CLINIC | Age: 39
End: 2024-12-10
Payer: COMMERCIAL

## 2024-12-10 NOTE — TELEPHONE ENCOUNTER
I returned this patients voicemail.  We went over the no show policy and scheduled a future appointment with Dr Fox.

## 2025-01-18 ENCOUNTER — HEALTH MAINTENANCE LETTER (OUTPATIENT)
Age: 40
End: 2025-01-18

## 2025-02-18 ENCOUNTER — OFFICE VISIT (OUTPATIENT)
Dept: INTERNAL MEDICINE | Facility: CLINIC | Age: 40
End: 2025-02-18
Payer: COMMERCIAL

## 2025-02-18 VITALS
DIASTOLIC BLOOD PRESSURE: 61 MMHG | SYSTOLIC BLOOD PRESSURE: 118 MMHG | RESPIRATION RATE: 15 BRPM | BODY MASS INDEX: 37.02 KG/M2 | TEMPERATURE: 98.9 F | OXYGEN SATURATION: 98 % | HEART RATE: 71 BPM | WEIGHT: 258 LBS

## 2025-02-18 DIAGNOSIS — M19.90 INFLAMMATORY ARTHRITIS: ICD-10-CM

## 2025-02-18 DIAGNOSIS — R30.0 DYSURIA: Primary | ICD-10-CM

## 2025-02-18 DIAGNOSIS — D84.9 IMMUNOSUPPRESSED STATUS: ICD-10-CM

## 2025-02-18 LAB
ALBUMIN UR-MCNC: 30 MG/DL
APPEARANCE UR: CLEAR
BACTERIA #/AREA URNS HPF: ABNORMAL /HPF
BILIRUB UR QL STRIP: NEGATIVE
COLOR UR AUTO: YELLOW
GLUCOSE UR STRIP-MCNC: NEGATIVE MG/DL
HGB UR QL STRIP: ABNORMAL
KETONES UR STRIP-MCNC: NEGATIVE MG/DL
LEUKOCYTE ESTERASE UR QL STRIP: NEGATIVE
MUCOUS THREADS #/AREA URNS LPF: PRESENT /LPF
NITRATE UR QL: POSITIVE
PH UR STRIP: 6.5 [PH] (ref 5–8)
RBC #/AREA URNS AUTO: ABNORMAL /HPF
SP GR UR STRIP: 1.02 (ref 1–1.03)
SQUAMOUS #/AREA URNS AUTO: ABNORMAL /LPF
UROBILINOGEN UR STRIP-ACNC: 1 E.U./DL
WBC #/AREA URNS AUTO: ABNORMAL /HPF

## 2025-02-18 PROCEDURE — 99204 OFFICE O/P NEW MOD 45 MIN: CPT | Performed by: INTERNAL MEDICINE

## 2025-02-18 PROCEDURE — 87086 URINE CULTURE/COLONY COUNT: CPT | Performed by: INTERNAL MEDICINE

## 2025-02-18 PROCEDURE — 87186 SC STD MICRODIL/AGAR DIL: CPT | Performed by: INTERNAL MEDICINE

## 2025-02-18 PROCEDURE — 81001 URINALYSIS AUTO W/SCOPE: CPT | Performed by: INTERNAL MEDICINE

## 2025-02-18 RX ORDER — LEFLUNOMIDE 20 MG/1
20 TABLET ORAL DAILY
COMMUNITY

## 2025-02-18 ASSESSMENT — PATIENT HEALTH QUESTIONNAIRE - PHQ9
SUM OF ALL RESPONSES TO PHQ QUESTIONS 1-9: 1
10. IF YOU CHECKED OFF ANY PROBLEMS, HOW DIFFICULT HAVE THESE PROBLEMS MADE IT FOR YOU TO DO YOUR WORK, TAKE CARE OF THINGS AT HOME, OR GET ALONG WITH OTHER PEOPLE: NOT DIFFICULT AT ALL
SUM OF ALL RESPONSES TO PHQ QUESTIONS 1-9: 1

## 2025-02-18 NOTE — PROGRESS NOTES
1. Dysuria (Primary)  Urinalysis does appear to be suggestive of cystitis.  Will treat with Augmentin.  If things or not improving or worsening she needs to be seen as medially specially given her immunosuppressed state.  - amoxicillin-clavulanate (AUGMENTIN) 875-125 MG tablet; Take 1 tablet by mouth 2 times daily for 5 days.  Dispense: 10 tablet; Refill: 0    2. Inflammatory arthritis  She wants to switch to a rheumatologist through [facility].  I did discuss with her this could take many months to arrange.  - Adult Rheumatology  Referral; Future    3. Immunosuppressed status  As above.  She also has a history of abnormal Pap smears etc.  I recommend that she establish care with a provider that provides gynecologic care    Subjective   Roseann is a 39 year old, presenting for the following health issues:  UTI (Dysuria, urgency, and frequency x 2 days )      2/18/2025     3:05 PM   Additional Questions   Roomed by January   Accompanied by alone         2/18/2025     3:05 PM   Patient Reported Additional Medications   Patient reports taking the following new medications none     UTI    History of Present Illness       Reason for visit:  UTI Symptoms  Symptom onset:  1-3 days ago  Symptoms include:  Right flank pains, urgency, frequency, etc  Symptom intensity:  Mild  Symptom progression:  Worsening  Had these symptoms before:  Yes  Has tried/received treatment for these symptoms:  Yes  Previous treatment was successful:  Yes  Prior treatment description:  Not sure  What makes it worse:  N/A  What makes it better:  N/A   She is taking medications regularly.           This is a young woman who is new to the [email or web address] system.  She was formally followed in the [facility] system.  She has a diagnosis of what sounds like rheumatoid arthritis.  She is on leflunomide and Humira.  She is followed by [facility].  She wants to switch everything over to [facility].  She has been having burning on urination for the  last couple days.  Some mild right flank pain.  No fevers chills or other symptoms of pyelonephritis.    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          Objective    /61 (BP Location: Left arm, Patient Position: Sitting, Cuff Size: Adult Large)   Pulse 71   Temp 98.9  F (37.2  C) (Tympanic)   Resp 15   Wt 117 kg (258 lb)   LMP 02/05/2025 (Approximate)   SpO2 98%   BMI 37.02 kg/m    Body mass index is 37.02 kg/m .  Physical Exam   Pleasant obese woman.  She smells faintly of marijuana smoke.  No distress.  Not acutely ill in appearance.            Signed Electronically by: LEVI HILL MD

## 2025-02-20 LAB — BACTERIA UR CULT: ABNORMAL

## 2025-02-20 RX ORDER — FLUCONAZOLE 150 MG/1
150 TABLET ORAL ONCE
Qty: 1 TABLET | Refills: 0 | Status: SHIPPED | OUTPATIENT
Start: 2025-02-20 | End: 2025-02-20

## 2025-02-20 RX ORDER — SULFAMETHOXAZOLE AND TRIMETHOPRIM 800; 160 MG/1; MG/1
1 TABLET ORAL 2 TIMES DAILY
Qty: 10 TABLET | Refills: 0 | Status: SHIPPED | OUTPATIENT
Start: 2025-02-20 | End: 2025-02-25

## 2025-08-09 ENCOUNTER — HEALTH MAINTENANCE LETTER (OUTPATIENT)
Age: 40
End: 2025-08-09

## (undated) DEVICE — STPL SKIN 35W ROTATING HEAD PRW35

## (undated) DEVICE — SOL WATER IRRIG 1000ML BOTTLE 2F7114

## (undated) DEVICE — PAD CHUX UNDERPAD 23X24" 7136

## (undated) DEVICE — SPONGE LAP 18X18" X8435

## (undated) DEVICE — PEN MARKING SKIN W/LABELS 31145884

## (undated) DEVICE — ESU ELEC BLADE HEX-LOCKING 2.5" E1450X

## (undated) DEVICE — SOL NACL 0.9% IRRIG 1000ML BOTTLE 2F7124

## (undated) DEVICE — DECANTER BAG 2002S

## (undated) DEVICE — PREP CHLORAPREP 26ML TINTED HI-LITE ORANGE 930815

## (undated) DEVICE — GLOVE BIOGEL PI MICRO SZ 6.5 48565

## (undated) DEVICE — SU MONOCRYL 3-0 PS-2 27" Y427H

## (undated) DEVICE — SU PDS II 2-0 CT-1 27" Z339H

## (undated) DEVICE — BLADE KNIFE SURG 10 371110

## (undated) DEVICE — DRAPE BREAST/CHEST 29420

## (undated) DEVICE — SU MONOCRYL 4-0 PS-2 18" UND Y496G

## (undated) DEVICE — PACK MAJOR SBA15MAFSI

## (undated) DEVICE — BNDG ELASTIC 6" DBL LENGTH UNSTERILE 6611-16

## (undated) DEVICE — LINEN TOWEL PACK X5 5464

## (undated) DEVICE — DRSG KERLIX 4 1/2"X4YDS ROLL 6715

## (undated) DEVICE — DRSG XEROFORM 5X9" 8884431605

## (undated) DEVICE — SU MONOCRYL 5-0 P-3 18" UND Y493G

## (undated) RX ORDER — ACETAMINOPHEN 500 MG
TABLET ORAL
Status: DISPENSED
Start: 2024-08-27

## (undated) RX ORDER — HYDROMORPHONE HYDROCHLORIDE 1 MG/ML
INJECTION, SOLUTION INTRAMUSCULAR; INTRAVENOUS; SUBCUTANEOUS
Status: DISPENSED
Start: 2024-08-27

## (undated) RX ORDER — HYDROMORPHONE HCL IN WATER/PF 6 MG/30 ML
PATIENT CONTROLLED ANALGESIA SYRINGE INTRAVENOUS
Status: DISPENSED
Start: 2024-08-27

## (undated) RX ORDER — PROPOFOL 10 MG/ML
INJECTION, EMULSION INTRAVENOUS
Status: DISPENSED
Start: 2024-08-27

## (undated) RX ORDER — FENTANYL CITRATE 50 UG/ML
INJECTION, SOLUTION INTRAMUSCULAR; INTRAVENOUS
Status: DISPENSED
Start: 2024-08-27

## (undated) RX ORDER — OXYCODONE HYDROCHLORIDE 5 MG/1
TABLET ORAL
Status: DISPENSED
Start: 2024-08-27

## (undated) RX ORDER — CEFAZOLIN SODIUM/WATER 2 G/20 ML
SYRINGE (ML) INTRAVENOUS
Status: DISPENSED
Start: 2024-08-27

## (undated) RX ORDER — ONDANSETRON 2 MG/ML
INJECTION INTRAMUSCULAR; INTRAVENOUS
Status: DISPENSED
Start: 2024-08-27

## (undated) RX ORDER — DEXAMETHASONE SODIUM PHOSPHATE 4 MG/ML
INJECTION, SOLUTION INTRA-ARTICULAR; INTRALESIONAL; INTRAMUSCULAR; INTRAVENOUS; SOFT TISSUE
Status: DISPENSED
Start: 2024-08-27

## (undated) RX ORDER — KETOROLAC TROMETHAMINE 30 MG/ML
INJECTION, SOLUTION INTRAMUSCULAR; INTRAVENOUS
Status: DISPENSED
Start: 2024-08-27

## (undated) RX ORDER — FENTANYL CITRATE 0.05 MG/ML
INJECTION, SOLUTION INTRAMUSCULAR; INTRAVENOUS
Status: DISPENSED
Start: 2024-08-27